# Patient Record
Sex: FEMALE | Race: BLACK OR AFRICAN AMERICAN | NOT HISPANIC OR LATINO | Employment: FULL TIME | URBAN - METROPOLITAN AREA
[De-identification: names, ages, dates, MRNs, and addresses within clinical notes are randomized per-mention and may not be internally consistent; named-entity substitution may affect disease eponyms.]

---

## 2018-09-26 ENCOUNTER — OCCUPATIONAL MEDICINE (OUTPATIENT)
Dept: URGENT CARE | Facility: CLINIC | Age: 33
End: 2018-09-26
Payer: COMMERCIAL

## 2018-09-26 VITALS
WEIGHT: 220 LBS | DIASTOLIC BLOOD PRESSURE: 80 MMHG | RESPIRATION RATE: 16 BRPM | SYSTOLIC BLOOD PRESSURE: 114 MMHG | BODY MASS INDEX: 34.53 KG/M2 | OXYGEN SATURATION: 100 % | HEIGHT: 67 IN | HEART RATE: 87 BPM | TEMPERATURE: 97.8 F
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
## 2018-09-26 DIAGNOSIS — S61.259A CAT BITE OF FINGER, INITIAL ENCOUNTER: Primary | ICD-10-CM

## 2018-09-26 PROCEDURE — 99204 OFFICE O/P NEW MOD 45 MIN: CPT | Mod: 29 | Performed by: INTERNAL MEDICINE

## 2018-09-26 RX ORDER — AMOXICILLIN AND CLAVULANATE POTASSIUM 875; 125 MG/1; MG/1
1 TABLET, FILM COATED ORAL 2 TIMES DAILY
Qty: 14 TAB | Refills: 0 | Status: SHIPPED | OUTPATIENT
Start: 2018-09-26 | End: 2018-10-03

## 2018-09-26 ASSESSMENT — ENCOUNTER SYMPTOMS
CHILLS: 0
VOMITING: 0
COUGH: 0
FEVER: 0
WEAKNESS: 0
CHANGE IN BOWEL HABIT: 0
NAUSEA: 0
SORE THROAT: 0
NUMBNESS: 0

## 2018-09-26 NOTE — LETTER
"   [facility]  [address] - [place] [address]  Phone:  [phone] - Fax:  [phone]   [facility] Progress Report and Disability Certification  Date of Service: 9/26/2018   No Show:  No  Date / Time of Next Visit: 9/29/2018   Claim Information   Patient Name: Allison Collins  Claim Number:     Employer:    Date of Injury: 9/25/2018     Insurer / TPA: [employer]  ID / SSN:     Occupation: VETRINARIAN  Diagnosis: The encounter diagnosis was Cat bite of finger, initial encounter.    Medical Information   Related to Industrial Injury? Yes    Subjective Complaints:  DOI 9/25/18: Patient was bit by a cat at work yesterday at approximately 4 PM.  She has had increased swelling in the area.  She is tried ibuprofen x1 with some improvement.  She says redness and swelling has improved since yesterday.  She denies fever, chills, nausea.  No vomiting or diarrhea.  No numbness or tingling in extremity.  She has full range of motion of finger.  No previous injury or surgery.   Objective Findings:   /80 (BP Location: Right arm, Patient Position: Sitting, BP Cuff Size: Adult)   Pulse 87   Temp 36.6 °C (97.8 °F) (Temporal)   Resp 16   Ht 1.702 m (5' 7\")   Wt 99.8 kg (220 lb)   LMP 09/21/2018 (Exact Date)   SpO2 100%   Breastfeeding? No   BMI 34.46 kg/m²     Physical Exam   Constitutional: She is oriented to person, place, and time. She appears well-developed and well-nourished. No distress.   HENT:   Head: Normocephalic and atraumatic.   Eyes: Pupils are equal, round, and reactive to light. Conjunctivae are normal.   Cardiovascular: Normal rate and regular rhythm.    Pulmonary/Chest: Effort normal and breath sounds normal.   Neurological: She is alert and oriented to person, place, and time.   Skin: Skin is warm and dry. Capillary refill takes less than 2 seconds.        Psychiatric: She has a normal mood and affect. Her behavior is " normal.   Vitals reviewed.    Right first finger: Multiple pinpoint puncture wounds with surrounding erythema and edema.  N/V intact.  Range of motion, strength, tone intact.   Pre-Existing Condition(s):     Assessment:   Initial Visit    Status: Additional Care Required  Permanent Disability:No    Plan: Medication  Comments:Augmentin times 7 days.    Diagnostics:      Comments:       Disability Information   Status: Released to Full Duty    From:  9/26/2018  Through: 9/29/2018 Restrictions are:     Physical Restrictions   Sitting:    Standing:    Stooping:    Bending:      Squatting:    Walking:    Climbing:    Pushing:      Pulling:    Other:    Reaching Above Shoulder (L):   Reaching Above Shoulder (R):       Reaching Below Shoulder (L):    Reaching Below Shoulder (R):      Not to exceed Weight Limits   Carrying(hrs):   Weight Limit(lb):   Lifting(hrs):   Weight  Limit(lb):     Comments:      Repetitive Actions   Hands: i.e. Fine Manipulations from Grasping:     Feet: i.e. Operating Foot Controls:     Driving / Operate Machinery:     Physician Name: Talisha Smith P.A.-C. Physician Signature: TALISHA Hargrove P.A.-C. e-Signature: Dr. Stephen Baron, Medical Director   Clinic Name / Location: [facility] [address]  [place] [address] Clinic Phone Number: Dept: [phone]   Appointment Time: 10:45 Am Visit Start Time: 11:00 AM   Check-In Time:  10:47 Am Visit Discharge Time:  11:37am   Original-Treating Physician or Chiropractor    Page 2-Insurer/TPA    Page 3-Employer    Page 4-Employee

## 2018-09-26 NOTE — PROGRESS NOTES
"Subjective:   Allison Collins is a 33 y.o. female who presents for Cat Bite (Yesterday afternoon, Right hand, first finger.)    DOI 9/25/18: Patient was bit by a cat at work yesterday at approximately 4 PM.  She has had increased swelling in the area.  She is tried ibuprofen x1 with some improvement.  She says redness and swelling has improved since yesterday.  She denies fever, chills, nausea.  No vomiting or diarrhea.  No numbness or tingling in extremity.  She has full range of motion of finger.  No previous injury or surgery.   Animal Bite   This is a new problem. The current episode started yesterday. The problem occurs constantly. The problem has been gradually improving. Pertinent negatives include no change in bowel habit, chills, coughing, fever, nausea, numbness, sore throat, vomiting or weakness. Nothing aggravates the symptoms. She has tried NSAIDs for the symptoms. The treatment provided no relief.     UTD tetanus   Review of Systems   Constitutional: Negative for chills and fever.   HENT: Negative for sore throat.    Respiratory: Negative for cough.    Gastrointestinal: Negative for change in bowel habit, nausea and vomiting.   Neurological: Negative for weakness and numbness.       PMH:  has no past medical history on file.  MEDS:   Current Outpatient Prescriptions:   •  amoxicillin-clavulanate (AUGMENTIN) 875-125 MG Tab, Take 1 Tab by mouth 2 times a day for 7 days., Disp: 14 Tab, Rfl: 0  ALLERGIES: No Known Allergies  SURGHX: History reviewed. No pertinent surgical history.  SOCHX:  reports that she has never smoked. She has never used smokeless tobacco.  History reviewed. No pertinent family history.     Objective:   /80 (BP Location: Right arm, Patient Position: Sitting, BP Cuff Size: Adult)   Pulse 87   Temp 36.6 °C (97.8 °F) (Temporal)   Resp 16   Ht 1.702 m (5' 7\")   Wt 99.8 kg (220 lb)   LMP 09/21/2018 (Exact Date)   SpO2 100%   Breastfeeding? No   BMI 34.46 kg/m²     Physical " "Exam   Constitutional: She is oriented to person, place, and time. She appears well-developed and well-nourished. No distress.   HENT:   Head: Normocephalic and atraumatic.   Eyes: Pupils are equal, round, and reactive to light. Conjunctivae are normal.   Cardiovascular: Normal rate and regular rhythm.    Pulmonary/Chest: Effort normal and breath sounds normal.   Neurological: She is alert and oriented to person, place, and time.   Skin: Skin is warm and dry. Capillary refill takes less than 2 seconds.        Psychiatric: She has a normal mood and affect. Her behavior is normal.   Vitals reviewed.      /80 (BP Location: Right arm, Patient Position: Sitting, BP Cuff Size: Adult)   Pulse 87   Temp 36.6 °C (97.8 °F) (Temporal)   Resp 16   Ht 1.702 m (5' 7\")   Wt 99.8 kg (220 lb)   LMP 09/21/2018 (Exact Date)   SpO2 100%   Breastfeeding? No   BMI 34.46 kg/m²     Physical Exam   Constitutional: She is oriented to person, place, and time. She appears well-developed and well-nourished. No distress.   HENT:   Head: Normocephalic and atraumatic.   Eyes: Pupils are equal, round, and reactive to light. Conjunctivae are normal.   Cardiovascular: Normal rate and regular rhythm.    Pulmonary/Chest: Effort normal and breath sounds normal.   Neurological: She is alert and oriented to person, place, and time.   Skin: Skin is warm and dry. Capillary refill takes less than 2 seconds.        Psychiatric: She has a normal mood and affect. Her behavior is normal.   Vitals reviewed.    Right first finger: Multiple pinpoint puncture wounds with surrounding erythema and edema.  N/V intact.  Range of motion, strength, tone intact.   Assessment/Plan:     1. Cat bite of finger, initial encounter  amoxicillin-clavulanate (AUGMENTIN) 875-125 MG Tab     Patient directed to take full course of abx regardless of sx resolution. If sx worsen or persist patient directed to return to clinic for reevaluation. Supportive care reviewed " including: Wound care, OTC ibuprofen.  Cat bite educational handout given to patient.    Follow-up in urgent care in 3 days.  Follow-up with primary care provider within 7-10 days, red flags and emergency room precautions discussed.  Patient appears understanding of information.     Case and results reviewed and agree with treatment plan as outlined.  Dr. Baron

## 2018-09-26 NOTE — LETTER
"EMPLOYEE’S CLAIM FOR COMPENSATION/ REPORT OF INITIAL TREATMENT  FORM C-4    EMPLOYEE’S CLAIM - PROVIDE ALL INFORMATION REQUESTED   First Name  Allison Last Name  Dennis Birthdate                    1985                Sex  female Claim Number   Home Address  [address] Age  33 y.o. 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 (if applicable)  HOLDING A CAT    How did this injury or occupational disease occur? (Be specific an answer in detail. Use additional sheet if necessary)  CAT BITE FROM FRACTIOUS CAT   If you believe that you have an occupational disease, when did you first have knowledge of the disability and it relationship to your employment?  N/A Witnesses to the Accident  N/A      Nature of Injury or Occupational Disease  Puncture  Part(s) of Body Injured or Affected  Hand (R), N/A, N/A    I certify that the above is true and correct to the best of my knowledge and that I have provided this information in order to obtain the benefits of [place] inclusive or Chapter 617 of [place]).  I hereby authorize any physician, chiropractor, surgeon, practitioner, or other person, any hospital, including [facility] or [facility] hospital, any medical service organization, any insurance company, or other institution or organization to release to each other, any medical or other information, including benefits paid or payable, pertinent to this injury or disease, except information relative to diagnosis, treatment and/or counseling for AIDS, psychological conditions, alcohol or controlled substances, for which I must give specific authorization.  A Photostat of this authorization shall be as valid as the original.     Date   Place   Employee’s Signature   THIS REPORT MUST BE COMPLETED AND MAILED WITHIN 3 WORKING DAYS OF TREATMENT   Place  [facility] [place]  Name of Facility  [facility]   Date  9/26/2018 Diagnosis  (S61.259A,  W55.01XA) Cat bite of finger, initial encounter  (primary encounter diagnosis) Is there evidence the injured employee was under the influence of alcohol and/or another controlled substance at the time of accident?   Hour  11:00 AM Description of Injury or Disease  The encounter diagnosis was Cat bite of finger, initial encounter. No   Treatment  Patient directed to take full course of antibiotics.  Monitor area for changes or signs of infection.  Have you advised the patient to remain off work five days or more? No   X-Ray Findings      If Yes   From Date  To Date      From information given by the employee, together with medical evidence, can you directly connect this injury or occupational disease as job incurred?  Yes If No Full Duty  Yes Modified Duty      Is additional medical care by a physician indicated?  No If Modified Duty, Specify any Limitations / Restrictions      Do you know of any previous injury or disease contributing to this condition or occupational disease?                            No  "  Date  9/26/2018 Print Doctor’s Name Talisha Smith P.A.-C. I certify the employer’s copy of  this form was mailed on:   Address  [address] Insurer’s Use Only     [facility] Zip  [address]    Provider’s Tax ID Number  [id number] Telephone  Dept: [phone]        e-TALISHA Reno P.A.-C.   e-Signature: Dr. Stephen Baron, Medical Director Degree  JORGE        ORIGINAL-TREATING PHYSICIAN OR CHIROPRACTOR    PAGE 2-INSURER/TPA    PAGE 3-EMPLOYER    PAGE 4-EMPLOYEE             Form C-4 (rev10/07)              BRIEF DESCRIPTION OF RIGHTS AND BENEFITS  (Pursuant to NRS 616C.050)    Notice of Injury or Occupational Disease (Incident Report Form C-1): If an injury or occupational disease (OD) arises out of and in the  course of employment, you must provide written notice to your employer as soon as practicable, but no later than 7 days after the accident or  OD. Your employer shall maintain a sufficient supply of the required forms.    Claim for Compensation (Form C-4): If medical treatment is sought, the form C-4 is available at the place of initial treatment. A completed  \"Claim for Compensation\" (Form C-4) must be filed within 90 days after an accident or OD. The treating physician or chiropractor must,  within 3 working days after treatment, complete and mail to the employer, the employer's insurer and third-party , the Claim for  Compensation.    Medical Treatment: If you require medical treatment for your on-the-job injury or OD, you may be required to select a physician or  chiropractor from a list provided by your workers’ compensation insurer, if it has contracted with an Organization for Managed Care (MCO) or  Preferred Provider Organization (PPO) or providers of health care. If your employer has not entered into a contract with an MCO or PPO, you  may select a physician or chiropractor from the Panel of Physicians and Chiropractors. Any medical costs " related to your industrial injury or  OD will be paid by your insurer.    Temporary Total Disability (TTD): If your doctor has certified that you are unable to work for a period of at least 5 consecutive days, or 5  cumulative days in a 20-day period, or places restrictions on you that your employer does not accommodate, you may be entitled to TTD  compensation.    Temporary Partial Disability (TPD): If the wage you receive upon reemployment is less than the compensation for TTD to which you are  entitled, the insurer may be required to pay you TPD compensation to make up the difference. TPD can only be paid for a maximum of 24  months.    Permanent Partial Disability (PPD): When your medical condition is stable and there is an indication of a PPD as a result of your injury or  OD, within 30 days, your insurer must arrange for an evaluation by a rating physician or chiropractor to determine the degree of your PPD. The  amount of your PPD award depends on the date of injury, the results of the PPD evaluation and your age and wage.    Permanent Total Disability (PTD): If you are medically certified by a treating physician or chiropractor as permanently and totally disabled  and have been granted a PTD status by your insurer, you are entitled to receive monthly benefits not to exceed 66 2/3% of your average  monthly wage. The amount of your PTD payments is subject to reduction if you previously received a PPD award.    Vocational Rehabilitation Services: You may be eligible for vocational rehabilitation services if you are unable to return to the job due to a  permanent physical impairment or permanent restrictions as a result of your injury or occupational disease.    Transportation and Per Baljit Reimbursement: You may be eligible for travel expenses and per baljit associated with medical treatment.    Reopening: You may be able to reopen your claim if your condition worsens after claim closure.    Appeal Process: If you  disagree with a written determination issued by the insurer or the insurer does not respond to your request, you may  appeal to the Department of Administration, , by following the instructions contained in your determination letter. You must  appeal the determination within 70 days from the date of the determination letter at [address], [address], [place], [place]  [address], or [address], [address], [place], [place] [address]. If you disagree with the  decision, you may appeal to the  Department of Administration, . You must file your appeal within 30 days from the date of the  decision  letter at [address], [address], [place], [place] [address], or [address], [address], [place], [place] [address]. If you  disagree with a decision of an , you may file a petition for judicial review with the District Court. You must do so within 30  days of the Appeal Officer’s decision. You may be represented by an  at your own expense or you may contact the [facility] for possible  representation.    [place]  for Injured Workers (NAIW): If you disagree with a  decision, you may request that NAIW represent you  without charge at an  Hearing. For information regarding denial of benefits, you may contact the [facility] at: [address], [address], [place] [address], [phone], or [address], [place] [address], [phone]    To File a Complaint with the Division: If you wish to file a complaint with the  of the Division of Industrial Relations (DIR),  please contact the Workers’ Compensation Section, [address], [address], [place], [place] [address], telephone [phone], or  [address], [address], [place], [place] [address], telephone [phone].    For assistance with Workers’ Compensation  Issues: you may contact the Office of the Governor Consumer Health Assistance, [address] [place] [address], [place] [address], [place] [address], Toll Free 1-796.959.3946, Web site: http://govcha.[facility].nv., E-mail  [email or web address].                                                                                                                                                                                                                                   __________________________________________________________________                                                                   _________________                Employee Name / Signature                                                                                                                                                       Date                                                                                                                                                                                                     D-2 (rev. 10/07)

## 2018-09-29 ENCOUNTER — OCCUPATIONAL MEDICINE (OUTPATIENT)
Dept: URGENT CARE | Facility: CLINIC | Age: 33
End: 2018-09-29
Payer: COMMERCIAL

## 2018-09-29 VITALS
RESPIRATION RATE: 16 BRPM | BODY MASS INDEX: 34.53 KG/M2 | SYSTOLIC BLOOD PRESSURE: 104 MMHG | WEIGHT: 220 LBS | DIASTOLIC BLOOD PRESSURE: 50 MMHG | OXYGEN SATURATION: 97 % | HEART RATE: 83 BPM | TEMPERATURE: 99.1 F | HEIGHT: 67 IN
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
## 2018-09-29 DIAGNOSIS — S61.259D CAT BITE OF FINGER, SUBSEQUENT ENCOUNTER: ICD-10-CM

## 2018-09-29 PROCEDURE — 99213 OFFICE O/P EST LOW 20 MIN: CPT | Mod: 29 | Performed by: NURSE PRACTITIONER

## 2018-09-29 ASSESSMENT — ENCOUNTER SYMPTOMS: FEVER: 0

## 2018-09-29 ASSESSMENT — PAIN SCALES - GENERAL: PAINLEVEL: NO PAIN

## 2018-09-29 NOTE — LETTER
[facility] [address] [address] [place] [address]  Phone:  [phone] - Fax:  [phone]   [facility] Progress Report and Disability Certification  Date of Service: 9/29/2018   No Show:  No  Date / Time of Next Visit: 10/4/2018   Claim Information   Patient Name: Allison Collins  Claim Number:     Employer:   [employer] Date of Injury: 9/25/2018     Insurer / TPA: [employer]     Occupation: VETRINARIAN  Diagnosis: The encounter diagnosis was Cat bite of finger, subsequent encounter.    Medical Information   Related to Industrial Injury? Yes    Subjective Complaints:  DOI 9/25/18: Patient was bit by a cat at work.  Initially had increased swelling in the area.  She is tried ibuprofen x1 with some improvement.  Tolerating Augmentin, day 3/7. Redness and swelling has improved.  She denies fever, chills, nausea. No numbness or tingling in extremity.  She has full range of motion of finger. Denies a second job.   Objective Findings: Right first finger: Multiple pinpoint puncture wounds with very mild surrounding erythema.  N/V intact.  Range of motion, strength, tone intact.   Pre-Existing Condition(s):     Assessment:   Condition Improved    Status: Additional Care Required  Permanent Disability:No    Plan:      Diagnostics:      Comments:  Finish course of ABX  RTC in 5 days for FV, anticipate discharge MMI at that time    Disability Information   Status: Released to Full Duty    From:  9/29/2018  Through: 10/4/2018 Restrictions are:     Physical Restrictions   Sitting:    Standing:    Stooping:    Bending:      Squatting:    Walking:    Climbing:    Pushing:      Pulling:    Other:    Reaching Above Shoulder (L):   Reaching Above Shoulder (R):       Reaching Below Shoulder (L):    Reaching Below Shoulder (R):      Not to exceed Weight Limits   Carrying(hrs):   Weight Limit(lb):   Lifting(hrs):   Weight  Limit(lb):     Comments:         Repetitive Actions   Hands: i.e. Fine Manipulations from Grasping:     Feet: i.e. Operating Foot Controls:     Driving / Operate Machinery:     Physician Name: VICENTA Spaulding Physician Signature: AAYUSH Trinh e-Signature: Dr. Stephen Baron, Medical Director   Clinic Name / Location: [address]  [place] [address] Clinic Phone Number: Dept: [phone]   Appointment Time: 8:30 Am Visit Start Time: 9:09 AM   Check-In Time:  9:04 Am Visit Discharge Time: 9:57 AM    Original-Treating Physician or Chiropractor    Page 2-Insurer/TPA    Page 3-Employer    Page 4-Employee

## 2018-09-29 NOTE — PROGRESS NOTES
"Subjective:      Allison Collins is a 33 y.o. female who presents with Follow-Up (WC FV RT finger cat bite.  PT states since previous visit, injury is feeling much better.)      DOI 9/25/18: Patient was bit by a cat at work.  Initially had increased swelling in the area.  She is tried ibuprofen x1 with some improvement.  Tolerating Augmentin, day 3/7. Redness and swelling has improved.  She denies fever, chills, nausea. No numbness or tingling in extremity.  She has full range of motion of finger. Denies a second job.     HPI    Review of Systems   Constitutional: Negative for fever.   Musculoskeletal:        Cat bite to right finger   All other systems reviewed and are negative.    History reviewed. No pertinent past medical history. History reviewed. No pertinent surgical history.   Social History     Social History   • Marital status: Other     Spouse name: N/A   • Number of children: N/A   • Years of education: N/A     Occupational History   • Not on file.     Social History Main Topics   • Smoking status: Never Smoker   • Smokeless tobacco: Never Used   • Alcohol use Not on file   • Drug use: Unknown   • Sexual activity: Not on file     Other Topics Concern   • Not on file     Social History Narrative   • No narrative on file          Objective:     /50 (BP Location: Right arm, Patient Position: Sitting, BP Cuff Size: Large adult)   Pulse 83   Temp 37.3 °C (99.1 °F) (Temporal)   Resp 16   Ht 1.702 m (5' 7\")   Wt 99.8 kg (220 lb)   LMP 09/21/2018 (Exact Date)   SpO2 97%   BMI 34.46 kg/m²      Physical Exam   Constitutional: She is oriented to person, place, and time. Vital signs are normal. She appears well-developed and well-nourished.   HENT:   Head: Normocephalic and atraumatic.   Eyes: Pupils are equal, round, and reactive to light. EOM are normal.   Neck: Normal range of motion.   Cardiovascular: Normal rate and regular rhythm.    Pulmonary/Chest: Effort normal.   Musculoskeletal: Normal range " of motion.   See D-39 exam notes   Neurological: She is alert and oriented to person, place, and time.   Skin: Skin is warm and dry. Capillary refill takes less than 2 seconds.   Psychiatric: She has a normal mood and affect. Her speech is normal and behavior is normal. Thought content normal.   Vitals reviewed.      Right first finger: Multiple pinpoint puncture wounds with very mild surrounding erythema.  N/V intact.  Range of motion, strength, tone intact.       Assessment/Plan:     1. Cat bite of finger, subsequent encounter    Finish course of ABX  RTC in 5 days for FV, anticipate discharge MMI at that time

## 2018-10-04 ENCOUNTER — OCCUPATIONAL MEDICINE (OUTPATIENT)
Dept: URGENT CARE | Facility: CLINIC | Age: 33
End: 2018-10-04
Payer: COMMERCIAL

## 2018-10-04 VITALS
TEMPERATURE: 98.3 F | HEART RATE: 63 BPM | BODY MASS INDEX: 35 KG/M2 | HEIGHT: 67 IN | WEIGHT: 223 LBS | DIASTOLIC BLOOD PRESSURE: 72 MMHG | SYSTOLIC BLOOD PRESSURE: 118 MMHG | OXYGEN SATURATION: 96 %
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
## 2018-10-04 PROCEDURE — 99212 OFFICE O/P EST SF 10 MIN: CPT | Mod: 29 | Performed by: PHYSICIAN ASSISTANT

## 2018-10-04 ASSESSMENT — ENCOUNTER SYMPTOMS
CONSTIPATION: 0
NAUSEA: 0
ABDOMINAL PAIN: 0
TINGLING: 0
FEVER: 0
WEAKNESS: 0
VOMITING: 0
DIARRHEA: 0
COUGH: 0
CHILLS: 0

## 2018-10-04 NOTE — LETTER
"   [facility]  [address] - [place] [address]  Phone:  [phone] - Fax:  [phone]   [facility] Progress Report and Disability Certification  Date of Service: 10/4/2018   No Show:  No  Date / Time of Next Visit:     Claim Information   Patient Name: Allison Collins  Claim Number:     Employer:    Date of Injury: 9/25/2018     Insurer / TPA: [employer]  ID / SSN:     Occupation: VETRINARIAN  Diagnosis: The encounter diagnosis was Cat bite, subsequent encounter.    Medical Information   Related to Industrial Injury? Yes    Subjective Complaints:  DOI 9/25/18: Patient was bit by a cat at work yesterday at approximately 4 PM.  She has had increased swelling in the area.  She is tried ibuprofen x1 with some improvement.  She says redness and swelling has improved since yesterday.  She denies fever, chills, nausea.  No vomiting or diarrhea.  No numbness or tingling in extremity.  She has full range of motion of finger.  No previous injury or surgery.     10/4/18 f/u: Bite much improved.  Patient completed course of Augmentin without complications.  She denies pain in area.  No numbness or tingling.  No difficulty with  or lifting with right hand.  No fever, chills.  No nausea, vomiting or diarrhea.  Patient has been back to work on full duty.   Objective Findings: /72 (BP Location: Right arm, Patient Position: Sitting, BP Cuff Size: Adult)   Pulse 63   Temp 36.8 °C (98.3 °F) (Temporal)   Ht 1.702 m (5' 7\")   Wt 101.2 kg (223 lb)   LMP 09/21/2018 (Exact Date)   SpO2 96%   BMI 34.93 kg/m²     Physical Exam   Constitutional: She is oriented to person, place, and time. She appears well-developed and well-nourished. No distress.   HENT:   Head: Normocephalic and atraumatic.   Eyes: Pupils are equal, round, and reactive to light. Conjunctivae are normal.   Cardiovascular: Normal rate and regular rhythm.    Pulmonary/Chest: Effort " normal and breath sounds normal.   Neurological: She is alert and oriented to person, place, and time.   Skin: Skin is warm and dry. Capillary refill takes less than 2 seconds.        Psychiatric: She has a normal mood and affect. Her behavior is normal.   Vitals reviewed.    L 2nd finger: 2 well-healed puncture wounds.  No erythema or swelling.  N/V intact.  ROM, strength, tone intact bilateral upper extremities.   Pre-Existing Condition(s):     Assessment:   Condition Improved    Status: Discharged /  MMI  Permanent Disability:No    Plan: Medication  Comments:Completed course of Augmentin    Diagnostics:      Comments:       Disability Information   Status: Released to Full Duty    From:  10/4/2018  Through:   Restrictions are:     Physical Restrictions   Sitting:    Standing:    Stooping:    Bending:      Squatting:    Walking:    Climbing:    Pushing:      Pulling:    Other:    Reaching Above Shoulder (L):   Reaching Above Shoulder (R):       Reaching Below Shoulder (L):    Reaching Below Shoulder (R):      Not to exceed Weight Limits   Carrying(hrs):   Weight Limit(lb):   Lifting(hrs):   Weight  Limit(lb):     Comments:      Repetitive Actions   Hands: i.e. Fine Manipulations from Grasping:     Feet: i.e. Operating Foot Controls:     Driving / Operate Machinery:     Physician Name: Talisha Smith P.A.-C. Physician Signature: TALISHA Hargrove P.A.-C. e-Signature: Dr. Stephen Baron, Medical Director   Clinic Name / Location: [address] [place] [address] [place] [address] Clinic Phone Number: Dept: [phone]   Appointment Time: 5:30 Pm Visit Start Time: 6:02 PM   Check-In Time:  5:25 Pm Visit Discharge Time:  6:40 PM   Original-Treating Physician or Chiropractor    Page 2-Insurer/TPA    Page 3-Employer    Page 4-Employee

## 2018-10-05 NOTE — PROGRESS NOTES
"Subjective:   Allison Collins is a 33 y.o. female who presents for Animal Bite (cat bite follow up, Pt states it is doing well)      DOI 9/25/18: Patient was bit by a cat at work yesterday at approximately 4 PM.  She has had increased swelling in the area.  She is tried ibuprofen x1 with some improvement.  She says redness and swelling has improved since yesterday.  She denies fever, chills, nausea.  No vomiting or diarrhea.  No numbness or tingling in extremity.  She has full range of motion of finger.  No previous injury or surgery.     10/4/18 f/u: Bite much improved.  Patient completed course of Augmentin without complications.  She denies pain in area.  No numbness or tingling.  No difficulty with  or lifting with right hand.  No fever, chills.  No nausea, vomiting or diarrhea.  Patient has been back to work on full duty.       Review of Systems   Constitutional: Negative for chills, fever and malaise/fatigue.   Respiratory: Negative for cough.    Gastrointestinal: Negative for abdominal pain, constipation, diarrhea, nausea and vomiting.   Neurological: Negative for tingling and weakness.   All other systems reviewed and are negative.      PMH:  has no past medical history on file.  MEDS: No current outpatient prescriptions on file.  ALLERGIES: No Known Allergies  SURGHX: No past surgical history on file.  SOCHX:  reports that she has never smoked. She has never used smokeless tobacco.  No family history on file.     Objective:   /72 (BP Location: Right arm, Patient Position: Sitting, BP Cuff Size: Adult)   Pulse 63   Temp 36.8 °C (98.3 °F) (Temporal)   Ht 1.702 m (5' 7\")   Wt 101.2 kg (223 lb)   LMP 09/21/2018 (Exact Date)   SpO2 96%   BMI 34.93 kg/m²     Physical Exam   Constitutional: She is oriented to person, place, and time. She appears well-developed and well-nourished. No distress.   HENT:   Head: Normocephalic and atraumatic.   Eyes: Pupils are equal, round, and reactive to light. " Conjunctivae are normal.   Cardiovascular: Normal rate and regular rhythm.    Pulmonary/Chest: Effort normal and breath sounds normal.   Neurological: She is alert and oriented to person, place, and time.   Skin: Skin is warm and dry. Capillary refill takes less than 2 seconds.        Psychiatric: She has a normal mood and affect. Her behavior is normal.   Vitals reviewed.      Assessment/Plan:     1. Cat bite, subsequent encounter       Watch for signs of infection.  Patient released to [facility].  D 39 provided. Follow-up with primary care provider within 7-10 days, emergency room precautions discussed.  Patient appears understanding of information.     Case and results reviewed and agree with treatment plan as outlined.  Dr. Baron

## 2019-04-01 ENCOUNTER — OFFICE VISIT (OUTPATIENT)
Dept: URGENT CARE | Facility: CLINIC | Age: 34
End: 2019-04-01

## 2019-04-01 ENCOUNTER — HOSPITAL ENCOUNTER (OUTPATIENT)
Facility: MEDICAL CENTER | Age: 34
End: 2019-04-01
Attending: PHYSICIAN ASSISTANT
Payer: COMMERCIAL

## 2019-04-01 VITALS
BODY MASS INDEX: 34.69 KG/M2 | OXYGEN SATURATION: 99 % | WEIGHT: 221 LBS | TEMPERATURE: 98 F | RESPIRATION RATE: 16 BRPM | DIASTOLIC BLOOD PRESSURE: 60 MMHG | HEART RATE: 66 BPM | HEIGHT: 67 IN | SYSTOLIC BLOOD PRESSURE: 114 MMHG

## 2019-04-01 DIAGNOSIS — N30.90 CYSTITIS: ICD-10-CM

## 2019-04-01 PROCEDURE — 99214 OFFICE O/P EST MOD 30 MIN: CPT | Performed by: PHYSICIAN ASSISTANT

## 2019-04-01 PROCEDURE — 87086 URINE CULTURE/COLONY COUNT: CPT

## 2019-04-01 RX ORDER — NITROFURANTOIN 25; 75 MG/1; MG/1
100 CAPSULE ORAL EVERY 12 HOURS
Qty: 10 CAP | Refills: 0 | Status: SHIPPED | OUTPATIENT
Start: 2019-04-01 | End: 2019-04-06

## 2019-04-01 ASSESSMENT — ENCOUNTER SYMPTOMS
ABDOMINAL PAIN: 0
PAIN: 1
BACK PAIN: 1
FLANK PAIN: 0
CHILLS: 0
FEVER: 0

## 2019-04-02 DIAGNOSIS — N30.90 CYSTITIS: ICD-10-CM

## 2019-04-02 NOTE — PROGRESS NOTES
"  Subjective:   Allison Collins is a 34 y.o. female who presents today with   Chief Complaint   Patient presents with   • Pain     left lower back pain for few days       Pain   This is a new problem. The current episode started in the past 7 days. The problem occurs intermittently. Pertinent negatives include no abdominal pain, chills, fever or urinary symptoms. Exacerbated by: deep palpation.       PMH:  has no past medical history on file.  MEDS:   Current Outpatient Prescriptions:   •  nitrofurantoin monohyd macro (MACROBID) 100 MG Cap, Take 1 Cap by mouth every 12 hours for 5 days., Disp: 10 Cap, Rfl: 0  ALLERGIES: No Known Allergies  SURGHX: History reviewed. No pertinent surgical history.  SOCHX:  reports that she has never smoked. She has never used smokeless tobacco.  FH: Reviewed with patient, not pertinent to this visit.       Review of Systems   Constitutional: Negative for chills and fever.   Gastrointestinal: Negative for abdominal pain.   Genitourinary: Negative for dysuria, flank pain, frequency, hematuria and urgency.   Musculoskeletal: Positive for back pain.   All other systems reviewed and are negative.       Objective:   /60 (BP Location: Left arm, Patient Position: Sitting, BP Cuff Size: Adult)   Pulse 66   Temp 36.7 °C (98 °F) (Temporal)   Resp 16   Ht 1.702 m (5' 7\")   Wt 100.2 kg (221 lb)   SpO2 99%   BMI 34.61 kg/m²   Physical Exam   Constitutional: She appears well-developed and well-nourished. No distress.   HENT:   Head: Normocephalic and atraumatic.   Eyes: Pupils are equal, round, and reactive to light.   Cardiovascular: Normal rate, regular rhythm and normal heart sounds.    Pulmonary/Chest: Effort normal and breath sounds normal.   Musculoskeletal:   Normal movement in all 4 extremities   Neurological: She is alert. Coordination normal.   Skin: Skin is warm and dry.   Psychiatric: She has a normal mood and affect.   Nursing note and vitals reviewed.  Patient's pain is " reproduced with deep palpation near kidney in right lower back. She does not have pain reproduced with lifting or twisting.   UA Trace Lysed Blood, Sole Small  No CVA tenderness  Assessment/Plan:   Assessment    1. Cystitis  - Urine Culture; Future  - nitrofurantoin monohyd macro (MACROBID) 100 MG Cap; Take 1 Cap by mouth every 12 hours for 5 days.  Dispense: 10 Cap; Refill: 0  Patient does not have any urinary symptoms but given her urine results she was treated with antibiotic today. Likely UTI vs muscle strain.   Differential diagnosis, natural history, supportive care, and indications for immediate follow-up discussed.   Patient given instructions and understanding of medications and treatment.    If not improving in 3-5 days, F/U with PCP or return to UC if symptoms worsen.  Strict ER precautions given if fever or pain persists.   Patient agreeable to plan.      Livan Vicente PA-C

## 2019-04-04 LAB
BACTERIA UR CULT: NORMAL
SIGNIFICANT IND 70042: NORMAL
SITE SITE: NORMAL
SOURCE SOURCE: NORMAL

## 2020-01-12 ENCOUNTER — OFFICE VISIT (OUTPATIENT)
Dept: URGENT CARE | Facility: CLINIC | Age: 35
End: 2020-01-12
Payer: COMMERCIAL

## 2020-01-12 VITALS
RESPIRATION RATE: 20 BRPM | DIASTOLIC BLOOD PRESSURE: 80 MMHG | WEIGHT: 211 LBS | TEMPERATURE: 98.3 F | HEART RATE: 78 BPM | SYSTOLIC BLOOD PRESSURE: 130 MMHG | OXYGEN SATURATION: 99 % | BODY MASS INDEX: 33.05 KG/M2

## 2020-01-12 DIAGNOSIS — J32.9 RHINOSINUSITIS: ICD-10-CM

## 2020-01-12 PROCEDURE — 99214 OFFICE O/P EST MOD 30 MIN: CPT | Performed by: PHYSICIAN ASSISTANT

## 2020-01-12 RX ORDER — FLUTICASONE PROPIONATE 50 MCG
1 SPRAY, SUSPENSION (ML) NASAL DAILY
Qty: 16 G | Refills: 0 | Status: SHIPPED | OUTPATIENT
Start: 2020-01-12 | End: 2021-08-28

## 2020-01-12 RX ORDER — AMOXICILLIN AND CLAVULANATE POTASSIUM 875; 125 MG/1; MG/1
1 TABLET, FILM COATED ORAL 2 TIMES DAILY
Qty: 14 TAB | Refills: 0 | Status: SHIPPED | OUTPATIENT
Start: 2020-01-12 | End: 2020-01-19

## 2020-01-12 ASSESSMENT — ENCOUNTER SYMPTOMS
WHEEZING: 0
COUGH: 1
FEVER: 0
RHINORRHEA: 1
CHILLS: 0
MYALGIAS: 0
SHORTNESS OF BREATH: 0
SORE THROAT: 1
HEMOPTYSIS: 0
HEADACHES: 1

## 2020-01-12 ASSESSMENT — COPD QUESTIONNAIRES: COPD: 0

## 2020-01-12 NOTE — PROGRESS NOTES
Subjective:   Allison Collins is a 34 y.o. female who presents for Sinusitis        Cough   This is a new problem. The current episode started 1 to 4 weeks ago (4 weeks). The problem has been waxing and waning. The problem occurs constantly. The cough is non-productive. Associated symptoms include headaches, nasal congestion, postnasal drip, rhinorrhea and a sore throat. Pertinent negatives include no chest pain, chills, ear congestion, ear pain, fever, hemoptysis, myalgias, shortness of breath or wheezing. Associated symptoms comments: Sinus pressure off and on. Treatments tried: mucinex, robitussin. The treatment provided moderate relief. There is no history of asthma, bronchitis, COPD, environmental allergies or pneumonia.     Review of Systems   Constitutional: Negative for chills and fever.   HENT: Positive for postnasal drip, rhinorrhea and sore throat. Negative for ear pain.    Respiratory: Positive for cough. Negative for hemoptysis, shortness of breath and wheezing.    Cardiovascular: Negative for chest pain.   Musculoskeletal: Negative for myalgias.   Neurological: Positive for headaches.   Endo/Heme/Allergies: Negative for environmental allergies.       PMH:  has no past medical history of Allergy or Asthma.  MEDS:   Current Outpatient Medications:   •  amoxicillin-clavulanate (AUGMENTIN) 875-125 MG Tab, Take 1 Tab by mouth 2 times a day for 7 days., Disp: 14 Tab, Rfl: 0  •  fluticasone (FLONASE) 50 MCG/ACT nasal spray, Spray 1 Spray in nose every day., Disp: 16 g, Rfl: 0  ALLERGIES: No Known Allergies  SURGHX: History reviewed. No pertinent surgical history.  SOCHX:  reports that she has never smoked. She has never used smokeless tobacco.  FH: Family history was reviewed, no pertinent findings to report   Objective:   /80   Pulse 78   Temp 36.8 °C (98.3 °F) (Temporal)   Resp 20   Wt 95.7 kg (211 lb)   SpO2 99%   BMI 33.05 kg/m²   Physical Exam  Vitals signs reviewed.   Constitutional:        General: She is not in acute distress.     Appearance: Normal appearance. She is well-developed. She is not toxic-appearing.   HENT:      Head: Normocephalic and atraumatic.      Right Ear: Tympanic membrane, ear canal and external ear normal.      Left Ear: Tympanic membrane, ear canal and external ear normal.      Nose: Mucosal edema, congestion and rhinorrhea present. Rhinorrhea is purulent.      Right Sinus: No maxillary sinus tenderness or frontal sinus tenderness.      Left Sinus: No maxillary sinus tenderness or frontal sinus tenderness.      Mouth/Throat:      Lips: Pink.      Mouth: Mucous membranes are moist.      Pharynx: Oropharynx is clear. Uvula midline.   Eyes:      General: Lids are normal.      Conjunctiva/sclera: Conjunctivae normal.   Neck:      Musculoskeletal: Neck supple.   Cardiovascular:      Rate and Rhythm: Normal rate and regular rhythm.      Heart sounds: Normal heart sounds, S1 normal and S2 normal. No murmur. No friction rub. No gallop.    Pulmonary:      Effort: Pulmonary effort is normal. No respiratory distress.      Breath sounds: Normal breath sounds. No decreased breath sounds, wheezing, rhonchi or rales.   Musculoskeletal:      Comments: Normal range of motion. Exhibits no edema and no tenderness.    Lymphadenopathy:      Cervical: No cervical adenopathy.      Right cervical: No superficial or posterior cervical adenopathy.     Left cervical: No superficial or posterior cervical adenopathy.   Skin:     General: Skin is warm and dry.      Capillary Refill: Capillary refill takes less than 2 seconds.   Neurological:      Mental Status: She is alert and oriented to person, place, and time.      Cranial Nerves: No cranial nerve deficit.      Sensory: No sensory deficit.   Psychiatric:         Speech: Speech normal.         Behavior: Behavior normal.         Thought Content: Thought content normal.         Judgment: Judgment normal.           Assessment/Plan:   1. Rhinosinusitis  -  amoxicillin-clavulanate (AUGMENTIN) 875-125 MG Tab; Take 1 Tab by mouth 2 times a day for 7 days.  Dispense: 14 Tab; Refill: 0  - fluticasone (FLONASE) 50 MCG/ACT nasal spray; Spray 1 Spray in nose every day.  Dispense: 16 g; Refill: 0    Pt instructed to complete full course of medication despite symptomatic improvement. Pt to take med with meals to alleviate GI upset. Pt to take a probiotic or eat Neha’s yogurt/ kefir while taking the medication.    Flonase 1 squirt in each nostril, as instructed in clinic, once a day.  Use nasal saline TID to promote drainage.   Salt water gurgles to soothe sore throat.  Ayr saline gel to moisturize nasal passage and prevent bleeding.  Try to use sudafed sparingly in order to allow sinuses to drain.  Avoid the longer formulations and try to take only in the morning and/or at noon if needed.  If you fail to improve in 3-5 days or symptoms worsen/new symptoms develop, RTC for reevaluation    Differential diagnosis, natural history, supportive care, and indications for immediate follow-up discussed.

## 2020-01-23 ENCOUNTER — HOSPITAL ENCOUNTER (OUTPATIENT)
Dept: RADIOLOGY | Facility: MEDICAL CENTER | Age: 35
End: 2020-01-23
Attending: NURSE PRACTITIONER
Payer: COMMERCIAL

## 2020-01-23 ENCOUNTER — OFFICE VISIT (OUTPATIENT)
Dept: URGENT CARE | Facility: MEDICAL CENTER | Age: 35
End: 2020-01-23
Payer: COMMERCIAL

## 2020-01-23 VITALS
HEART RATE: 103 BPM | WEIGHT: 214.6 LBS | SYSTOLIC BLOOD PRESSURE: 112 MMHG | RESPIRATION RATE: 12 BRPM | HEIGHT: 67 IN | TEMPERATURE: 99.1 F | BODY MASS INDEX: 33.68 KG/M2 | DIASTOLIC BLOOD PRESSURE: 70 MMHG | OXYGEN SATURATION: 99 %

## 2020-01-23 DIAGNOSIS — R50.9 FEVER, UNSPECIFIED FEVER CAUSE: ICD-10-CM

## 2020-01-23 DIAGNOSIS — J10.1 INFLUENZA A: ICD-10-CM

## 2020-01-23 DIAGNOSIS — R05.9 COUGH: ICD-10-CM

## 2020-01-23 LAB
FLUAV+FLUBV AG SPEC QL IA: NORMAL
INT CON NEG: NORMAL
INT CON POS: NORMAL

## 2020-01-23 PROCEDURE — 87804 INFLUENZA ASSAY W/OPTIC: CPT | Performed by: NURSE PRACTITIONER

## 2020-01-23 PROCEDURE — 71046 X-RAY EXAM CHEST 2 VIEWS: CPT

## 2020-01-23 PROCEDURE — 99214 OFFICE O/P EST MOD 30 MIN: CPT | Performed by: NURSE PRACTITIONER

## 2020-01-23 RX ORDER — ALBUTEROL SULFATE 90 UG/1
2 AEROSOL, METERED RESPIRATORY (INHALATION) EVERY 6 HOURS PRN
Qty: 8.5 G | Refills: 0 | Status: SHIPPED | OUTPATIENT
Start: 2020-01-23 | End: 2021-08-28

## 2020-01-23 RX ORDER — CODEINE PHOSPHATE AND GUAIFENESIN 10; 100 MG/5ML; MG/5ML
5 SOLUTION ORAL EVERY 6 HOURS PRN
Qty: 120 ML | Refills: 0 | Status: SHIPPED | OUTPATIENT
Start: 2020-01-23 | End: 2020-01-30

## 2020-01-23 RX ORDER — OSELTAMIVIR PHOSPHATE 75 MG/1
75 CAPSULE ORAL 2 TIMES DAILY
Qty: 10 CAP | Refills: 0 | Status: SHIPPED | OUTPATIENT
Start: 2020-01-23 | End: 2020-05-13

## 2020-01-23 ASSESSMENT — ENCOUNTER SYMPTOMS
SPUTUM PRODUCTION: 1
CONSTITUTIONAL NEGATIVE: 1
COUGH: 1

## 2020-01-24 NOTE — PROGRESS NOTES
Subjective:      Allison Collins is a 34 y.o. female who presents with Fever ([id number], fever/cough/sneezing/body aches)    History reviewed. No pertinent past medical history.    Social History     Socioeconomic History   • Marital status: Other     Spouse name: Not on file   • Number of children: Not on file   • Years of education: Not on file   • Highest education level: Not on file   Occupational History   • Not on file   Social Needs   • Financial resource strain: Not on file   • Food insecurity:     Worry: Not on file     Inability: Not on file   • Transportation needs:     Medical: Not on file     Non-medical: Not on file   Tobacco Use   • Smoking status: Never Smoker   • Smokeless tobacco: Never Used   Substance and Sexual Activity   • Alcohol use: Not on file   • Drug use: Not on file   • Sexual activity: Not on file   Lifestyle   • Physical activity:     Days per week: Not on file     Minutes per session: Not on file   • Stress: Not on file   Relationships   • Social connections:     Talks on phone: Not on file     Gets together: Not on file     Attends [employer] service: Not on file     Active member of club or organization: Not on file     Attends meetings of clubs or organizations: Not on file     Relationship status: Not on file   • Intimate partner violence:     Fear of current or ex partner: Not on file     Emotionally abused: Not on file     Physically abused: Not on file     Forced sexual activity: Not on file   Other Topics Concern   • Not on file   Social History Narrative   • Not on file     History reviewed. No pertinent family history.    Allergies: Patient has no known allergies.    Patient is a 34-year-old female who presents today with complaint of fever, body aches, chills, and cough.  States she has been sick off and on over the last 6 weeks.  She recently completed a course of antibiotics for sinus infection.  She denies any difficulty breathing though does complain of intermittent  "shortness of breath with activity.          Cough   This is a new problem. The problem has been unchanged. The problem occurs every few minutes. The cough is productive of brown sputum. Nothing aggravates the symptoms. She has tried nothing for the symptoms. The treatment provided no relief.       Review of Systems   Constitutional: Negative.    Respiratory: Positive for cough and sputum production.    All other systems reviewed and are negative.         Objective:     /70 (BP Location: Left arm, Patient Position: Sitting, BP Cuff Size: Adult)   Pulse (!) 103   Temp 37.3 °C (99.1 °F) (Temporal)   Resp 12   Ht 1.702 m (5' 7\")   Wt 97.3 kg (214 lb 9.6 oz)   SpO2 99%   BMI 33.61 kg/m²      Physical Exam  Vitals signs reviewed.   Constitutional:       Appearance: Normal appearance.   HENT:      Head: Normocephalic.      Right Ear: Tympanic membrane, ear canal and external ear normal.      Left Ear: Tympanic membrane, ear canal and external ear normal.      Nose: Congestion present.      Mouth/Throat:      Mouth: Mucous membranes are moist.   Eyes:      Extraocular Movements: Extraocular movements intact.      Conjunctiva/sclera: Conjunctivae normal.      Pupils: Pupils are equal, round, and reactive to light.   Neck:      Musculoskeletal: Normal range of motion and neck supple.   Cardiovascular:      Rate and Rhythm: Normal rate and regular rhythm.      Heart sounds: Normal heart sounds.   Pulmonary:      Effort: Pulmonary effort is normal.      Breath sounds: Normal breath sounds.   Musculoskeletal: Normal range of motion.   Skin:     General: Skin is warm and dry.   Neurological:      Mental Status: She is alert and oriented to person, place, and time.   Psychiatric:         Mood and Affect: Mood normal.         Behavior: Behavior normal.         Thought Content: Thought content normal.         Judgment: Judgment normal.       X-ray, chest: Negative for pneumonia per radiologist    Point-of-care " influenza: Positive a          Assessment/Plan:     Cough  Influenza A    Tamiflu  Tylenol/Motrin as needed  Albuterol inhaler as needed  Cheratussin as needed for cough; clearly stated no driving or alcohol with this medication.  Checked patient's  and find no evidence for narcotic misuse  ER precautions given for respiratory distress  Call or return to urgent care otherwise for any further questions or concerns  There are no diagnoses linked to this encounter.

## 2020-03-13 ENCOUNTER — TELEPHONE (OUTPATIENT)
Dept: SCHEDULING | Facility: IMAGING CENTER | Age: 35
End: 2020-03-13

## 2020-03-13 ENCOUNTER — TELEPHONE (OUTPATIENT)
Dept: INTERNAL MEDICINE | Facility: IMAGING CENTER | Age: 35
End: 2020-03-13

## 2020-03-13 NOTE — TELEPHONE ENCOUNTER
1. Caller Name: Allison                      Call Back Number: [phone]  [facility] PCP or Specialty Provider: No          2.  Does patient have any active symptoms of respiratory illness (fever OR cough OR shortness of breath)? Yes, the patient reports the following respiratory symptoms: Allergies. Runny nose, itchy eyes, nasal congestion    3.  Does patient have any comoribidities? None     4.  In the last 30 days, has the patient traveled outside of the country OR in a high risk area within the  OR have any known contact with someone who has or is suspected to have COVID-19?  Yes, [place], [place] No contact with sick persons.     5. POTENTIAL PUI (LOW): Advised to perform self care, monitor for worsening symptoms and to call back in 3 days if no improvement CLEARED         Note routed to PCP: NO PCP at this time.

## 2020-05-11 NOTE — PROGRESS NOTES
Subjective:     CC:  Diagnoses of Obesity (BMI 30-39.9), Allergic rhinitis, unspecified seasonality, unspecified trigger, Sore armpit, left, and Need for vaccination were pertinent to this visit.    HISTORY OF THE PRESENT ILLNESS: Patient is a 35 y.o. female. This pleasant patient is here today to establish care and discuss allergy symptoms. Her prior PCP was Dr. Frost in [place].  States she was seen in [place] prior to that.  Would like Tdap today.    Allergic rhinitis  This is a chronic condition.  Onset: more than 10 years ago  Location: eyes, nose, sinus  Duration: constant  Modifying factors:  Some improvement with zyrtec and benadryl with less sneezing fits, however still gets itchy eyes occasionally and sneezing fits  Associated symptoms: sneezing, light cough, runny nose, itchy watery eyes, sinus pressure when symptoms are very severe.  Occasionally hives if direct contact with allergens.  States she had allergy testing in the past- trees, weeds, dog saliva, and some raw fruits and veggies.  States some foods such as celery make her throat feel like it is closing and oranges make her throat itchy.  Does not currently have an epi pen.  Denies ever needing intubation in the past.  She previously had immunotherapy about 10 years ago which helped improve symptoms, however symptoms are starting to reoccur more often and severely over the last 1-2 years  Home treatments: Zyrtec, benadryl daily.  She uses flonase about once per week.  Does not have SOB, so not using albuterol and no hx of asthma.      Sore armpit, left  This is a new condition.  Onset: yesterday  Location: left armpit  Duration: constant, mild  Quality: dull  Modifying factors: no improvement with rest or stretching.   Precipitating trauma: none  Associated symptoms: no redness, swelling, discharge.  Home treatments: none.  States she has had similar issues in the past after lifting heavy objects.  States her right armpit occasionally has the  same soreness.  No weakness, swelling, or skin changes.  She has not recently shaved the area        Allergies: Patient has no known allergies.    Current Outpatient Medications Ordered in Epic   Medication Sig Dispense Refill   • cetirizine (ZYRTEC) 10 MG Tab Take 10 mg by mouth every day.     • diphenhydrAMINE (BENADRYL) 25 MG Tab Take 25 mg by mouth every 6 hours as needed for Sleep.     • EPINEPHrine (EPIPEN 2-RYAN) 0.3 MG/0.3ML Solution Auto-injector solution for injection 0.3 mL by Intramuscular route Once for 1 dose. 0.3 mL 0   • albuterol 108 (90 Base) MCG/ACT Aero Soln inhalation aerosol Inhale 2 Puffs by mouth every 6 hours as needed. 8.5 g 0   • fluticasone (FLONASE) 50 MCG/ACT nasal spray Spray 1 Spray in nose every day. 16 g 0     No current Epic-ordered facility-administered medications on file.        Past Medical History:   Diagnosis Date   • Allergic rhinitis 5/13/2020       Past Surgical History:   Procedure Laterality Date   • DENTAL EXTRACTION(S)      wisdom teeth extraction       Social History     Tobacco Use   • Smoking status: Light Tobacco Smoker     Packs/day: 0.00     Types: Cigars   • Smokeless tobacco: Never Used   Substance Use Topics   • Alcohol use: Yes     Frequency: 2-3 times a week     Drinks per session: 1 or 2   • Drug use: Not Currently       Social History     Social History Narrative   • Not on file       Family History   Problem Relation Age of Onset   • Hypertension Mother    • Cancer Maternal Grandmother         breast cancer at 88   • Hypertension Maternal Grandmother    • Diabetes Maternal Grandmother        Health Maintenance: Tdap given today    ROS:   Gen: no fevers/chills, no changes in weight  Eyes: no changes in vision  ENT: no sore throat, no hearing loss, no bloody nose  Pulm: no sob, no cough  CV: no chest pain, no palpitations  GI: no nausea/vomiting, no diarrhea  : no dysuria  MSk: no myalgias  Skin: no rash  Neuro: no headaches, no  "numbness/tingling  Heme/Lymph: no easy bruising      Objective:     Exam: BP (!) 96/60 (BP Location: Left arm, Patient Position: Sitting, BP Cuff Size: Adult)   Pulse 68   Temp 36.9 °C (98.4 °F) (Temporal)   Ht 1.702 m (5' 7\")   Wt 100.4 kg (221 lb 6.4 oz)   SpO2 100%  Body mass index is 34.68 kg/m².    General: Normal appearing. No distress.  HEENT: Normocephalic.  Canals are clear bilaterally, tympanic membranes are benign, nasal mucosa benign, oropharynx is without erythema, edema or exudates.   Eyes: Eyes conjunctiva clear lids without ptosis, pupils equal and reactive to light accommodation, ears normal shape and contour  Neck: Supple. Thyroid is not enlarged.  Pulmonary: Clear to ausculation.  Normal effort. No rales, ronchi, or wheezing.  Cardiovascular: Regular rate and rhythm without murmur.   Abdomen: Soft, nontender, nondistended. Normal bowel sounds. Liver and spleen are not palpable  Neurologic: No gross motor deficits  Lymph: No cervical or supraclavicular lymph nodes are palpable.  No axillary lymph nodes palpated.  Skin: Warm and dry.  No obvious lesions.  Musculoskeletal: Normal gait. No extremity cyanosis, clubbing, or edema.  Psych: Normal mood and affect. Alert and oriented x3. Judgment and insight is normal.  Breast:  Bilaterally symmetrical, no nipple discharge, no skin changes or dimpling are  noted.  Both breasts are free of palpable pathology and the axillae are free of lymphadenopathy.    A chaperone was offered to the patient during today's exam. Chaperone name: Josevannessa Kelly was present.      Assessment & Plan:   35 y.o. female with the following -    1. Obesity (BMI 30-39.9)  This is a chronic, stable condition.  BMI today 34.  -dietary and exercise guidance given  -labs ordered    2. Allergic rhinitis, unspecified seasonality, unspecified trigger  This is a chronic, worsening condition despite zyrtec, benadryl and flonase.  She uses eye drops prn which do help improve eye " discomfort.  She would like referral back to allergy for immunotherapy.  Referral given.  Epipen Rx given due to hx of throat closing due to allergies in the past.  Follow-up and UCC/ER precautions given. Patient expressed understanding and agreement with plan.  - REFERRAL TO ALLERGY  - EPINEPHrine (EPIPEN 2-RYAN) 0.3 MG/0.3ML Solution Auto-injector solution for injection; 0.3 mL by Intramuscular route Once for 1 dose.  Dispense: 0.3 mL; Refill: 0    3. Sore armpit, left  This is a new condition, stable.  States she has had similar issues in the past that occur with lifting heavy objects and self resolve.  No skin changes or abnormalities noted on examination.  Discussed risks and benefits of getting imaging and patient would like to wait 2 weeks to see if symptoms resolve and if not, will call me to have imaging ordered.  Will do trial of stretching, avoiding heavy lifting and monitoring.  Pt to return in 2-4 weeks for pap smear and will re-evaluate at that time. Patient expressed understanding and agreement with plan.  - CBC WITH DIFFERENTIAL; Future  - Comp Metabolic Panel; Future  - Lipid Profile; Future  - HEMOGLOBIN A1C; Future    4. Need for vaccination  Pt is due for Tdap.  Risks and benefits discussed.  Vaccine given today  - Tdap =>6yo IM      Return in about 4 weeks (around 6/10/2020) for Annual plus pap.    Please note that this dictation was created using voice recognition software. I have made every reasonable attempt to correct obvious errors, but I expect that there are errors of grammar and possibly content that I did not discover before finalizing the note.

## 2020-05-13 ENCOUNTER — OFFICE VISIT (OUTPATIENT)
Dept: MEDICAL GROUP | Facility: MEDICAL CENTER | Age: 35
End: 2020-05-13
Payer: COMMERCIAL

## 2020-05-13 VITALS
WEIGHT: 221.4 LBS | HEIGHT: 67 IN | BODY MASS INDEX: 34.75 KG/M2 | SYSTOLIC BLOOD PRESSURE: 96 MMHG | DIASTOLIC BLOOD PRESSURE: 60 MMHG | HEART RATE: 68 BPM | TEMPERATURE: 98.4 F | OXYGEN SATURATION: 100 %

## 2020-05-13 DIAGNOSIS — E66.9 OBESITY (BMI 30-39.9): ICD-10-CM

## 2020-05-13 DIAGNOSIS — M79.622: ICD-10-CM

## 2020-05-13 DIAGNOSIS — J30.9 ALLERGIC RHINITIS, UNSPECIFIED SEASONALITY, UNSPECIFIED TRIGGER: ICD-10-CM

## 2020-05-13 DIAGNOSIS — Z23 NEED FOR VACCINATION: ICD-10-CM

## 2020-05-13 PROCEDURE — 99204 OFFICE O/P NEW MOD 45 MIN: CPT | Mod: 25 | Performed by: FAMILY MEDICINE

## 2020-05-13 PROCEDURE — 90471 IMMUNIZATION ADMIN: CPT | Performed by: FAMILY MEDICINE

## 2020-05-13 PROCEDURE — 90715 TDAP VACCINE 7 YRS/> IM: CPT | Performed by: FAMILY MEDICINE

## 2020-05-13 RX ORDER — EPINEPHRINE 0.3 MG/.3ML
0.3 INJECTION SUBCUTANEOUS ONCE
Qty: 0.3 ML | Refills: 0 | Status: SHIPPED | OUTPATIENT
Start: 2020-05-13 | End: 2020-05-13

## 2020-05-13 RX ORDER — DIPHENHYDRAMINE HCL 25 MG
25 TABLET ORAL EVERY 6 HOURS PRN
COMMUNITY

## 2020-05-13 RX ORDER — CETIRIZINE HYDROCHLORIDE 10 MG/1
10 TABLET ORAL DAILY
COMMUNITY

## 2020-05-13 SDOH — HEALTH STABILITY: MENTAL HEALTH: HOW MANY STANDARD DRINKS CONTAINING ALCOHOL DO YOU HAVE ON A TYPICAL DAY?: 1 OR 2

## 2020-05-13 SDOH — HEALTH STABILITY: MENTAL HEALTH: HOW OFTEN DO YOU HAVE A DRINK CONTAINING ALCOHOL?: 2-3 TIMES A WEEK

## 2020-05-13 ASSESSMENT — PATIENT HEALTH QUESTIONNAIRE - PHQ9: CLINICAL INTERPRETATION OF PHQ2 SCORE: 0

## 2020-05-13 NOTE — ASSESSMENT & PLAN NOTE
This is a new condition.  Onset: yesterday  Location: left armpit  Duration: constant, mild  Quality: dull  Modifying factors: no improvement with rest or stretching.   Precipitating trauma: none  Associated symptoms: no redness, swelling, discharge.  Home treatments: none.  States she has had similar issues in the past after lifting heavy objects.  States her right armpit occasionally has the same soreness.  No weakness, swelling, or skin changes.  She has not recently shaved the area

## 2020-05-13 NOTE — ASSESSMENT & PLAN NOTE
This is a chronic condition.  Onset: more than 10 years ago  Location: eyes, nose, sinus  Duration: constant  Modifying factors:  Some improvement with zyrtec and benadryl with less sneezing fits, however still gets itchy eyes occasionally and sneezing fits  Associated symptoms: sneezing, light cough, runny nose, itchy watery eyes, sinus pressure when symptoms are very severe.  Occasionally hives if direct contact with allergens.  States she had allergy testing in the past- trees, weeds, dog saliva, and some raw fruits and veggies.  States some foods such as celery make her throat feel like it is closing and oranges make her throat itchy.  Does not currently have an epi pen.  Denies ever needing intubation in the past.  She previously had immunotherapy about 10 years ago which helped improve symptoms, however symptoms are starting to reoccur more often and severely over the last 1-2 years  Home treatments: Zyrtec, benadryl daily.  She uses flonase about once per week.  Does not have SOB, so not using albuterol and no hx of asthma.

## 2020-06-08 ENCOUNTER — HOSPITAL ENCOUNTER (OUTPATIENT)
Dept: LAB | Facility: MEDICAL CENTER | Age: 35
End: 2020-06-08
Attending: FAMILY MEDICINE
Payer: COMMERCIAL

## 2020-06-08 DIAGNOSIS — M79.622: ICD-10-CM

## 2020-06-08 LAB
ALBUMIN SERPL BCP-MCNC: 4.3 G/DL (ref 3.2–4.9)
ALBUMIN/GLOB SERPL: 1.4 G/DL
ALP SERPL-CCNC: 61 U/L (ref 30–99)
ALT SERPL-CCNC: 17 U/L (ref 2–50)
ANION GAP SERPL CALC-SCNC: 10 MMOL/L (ref 7–16)
AST SERPL-CCNC: 15 U/L (ref 12–45)
BASOPHILS # BLD AUTO: 0.5 % (ref 0–1.8)
BASOPHILS # BLD: 0.03 K/UL (ref 0–0.12)
BILIRUB SERPL-MCNC: 0.3 MG/DL (ref 0.1–1.5)
BUN SERPL-MCNC: 11 MG/DL (ref 8–22)
CALCIUM SERPL-MCNC: 9.1 MG/DL (ref 8.4–10.2)
CHLORIDE SERPL-SCNC: 107 MMOL/L (ref 96–112)
CHOLEST SERPL-MCNC: 140 MG/DL (ref 100–199)
CO2 SERPL-SCNC: 26 MMOL/L (ref 20–33)
CREAT SERPL-MCNC: 0.88 MG/DL (ref 0.5–1.4)
EOSINOPHIL # BLD AUTO: 0.27 K/UL (ref 0–0.51)
EOSINOPHIL NFR BLD: 4.1 % (ref 0–6.9)
ERYTHROCYTE [DISTWIDTH] IN BLOOD BY AUTOMATED COUNT: 41.4 FL (ref 35.9–50)
EST. AVERAGE GLUCOSE BLD GHB EST-MCNC: 114 MG/DL
FASTING STATUS PATIENT QL REPORTED: NORMAL
GLOBULIN SER CALC-MCNC: 3.1 G/DL (ref 1.9–3.5)
GLUCOSE SERPL-MCNC: 95 MG/DL (ref 65–99)
HBA1C MFR BLD: 5.6 % (ref 0–5.6)
HCT VFR BLD AUTO: 42 % (ref 37–47)
HDLC SERPL-MCNC: 52 MG/DL
HGB BLD-MCNC: 13.4 G/DL (ref 12–16)
IMM GRANULOCYTES # BLD AUTO: 0.02 K/UL (ref 0–0.11)
IMM GRANULOCYTES NFR BLD AUTO: 0.3 % (ref 0–0.9)
LDLC SERPL CALC-MCNC: 78 MG/DL
LYMPHOCYTES # BLD AUTO: 2.52 K/UL (ref 1–4.8)
LYMPHOCYTES NFR BLD: 38.7 % (ref 22–41)
MCH RBC QN AUTO: 27.7 PG (ref 27–33)
MCHC RBC AUTO-ENTMCNC: 31.9 G/DL (ref 33.6–35)
MCV RBC AUTO: 87 FL (ref 81.4–97.8)
MONOCYTES # BLD AUTO: 0.54 K/UL (ref 0–0.85)
MONOCYTES NFR BLD AUTO: 8.3 % (ref 0–13.4)
NEUTROPHILS # BLD AUTO: 3.14 K/UL (ref 2–7.15)
NEUTROPHILS NFR BLD: 48.1 % (ref 44–72)
NRBC # BLD AUTO: 0 K/UL
NRBC BLD-RTO: 0 /100 WBC
PLATELET # BLD AUTO: 282 K/UL (ref 164–446)
PMV BLD AUTO: 8.4 FL (ref 9–12.9)
POTASSIUM SERPL-SCNC: 4.8 MMOL/L (ref 3.6–5.5)
PROT SERPL-MCNC: 7.4 G/DL (ref 6–8.2)
RBC # BLD AUTO: 4.83 M/UL (ref 4.2–5.4)
SODIUM SERPL-SCNC: 143 MMOL/L (ref 135–145)
TRIGL SERPL-MCNC: 50 MG/DL (ref 0–149)
WBC # BLD AUTO: 6.5 K/UL (ref 4.8–10.8)

## 2020-06-08 PROCEDURE — 80053 COMPREHEN METABOLIC PANEL: CPT

## 2020-06-08 PROCEDURE — 85025 COMPLETE CBC W/AUTO DIFF WBC: CPT

## 2020-06-08 PROCEDURE — 36415 COLL VENOUS BLD VENIPUNCTURE: CPT

## 2020-06-08 PROCEDURE — 83036 HEMOGLOBIN GLYCOSYLATED A1C: CPT

## 2020-06-08 PROCEDURE — 80061 LIPID PANEL: CPT

## 2020-07-05 NOTE — PROGRESS NOTES
Subjective:     CC:   Chief Complaint   Patient presents with   • Annual Exam   • Gynecologic Exam       HPI:   Allison Collins is a 35 y.o. female who presents for annual exam. She is feeling well and denies any complaints.    Ob-Gyn/ History:    Patient has GYN provider: no  /Para:  G0  Last Pap Smear:  About 7 years. no history of abnormal pap smears.  Gyn Surgery:  No.  Current Contraceptive Method:  No. She is currently sexually active.  Last menstrual period:  20.  Periods regular. light bleeding. Cramping is mild.   She does take OTC analgesics for cramps.  No significant bloating/fluid retention, pelvic pain, or dyspareunia. No vaginal discharge  Folate intake: good- takes a MV    Health Maintenance  Cholesterol Screening: up to date  Diabetes Screening: up to date   Diet: has been working to improve diet  Exercise: yes  Substance Abuse: none  Safe in relationship.  Seat belts, bike helmet, gun safety discussed.  Sun protection used.    Cancer screening  Cervical Cancer Screening: pap smear attempted today  Breast Cancer Screening: manual check today    Infectious disease screening/Immunizations  --STI Screening: declined  --Practices safe sex.  --HIV Screening: declined  --Immunizations:    Influenza: recommended in the fall   Pneumococcal : declined but will consider    She  has a past medical history of Allergic rhinitis (2020). She also has no past medical history of Asthma.  She  has a past surgical history that includes dental extraction(s).    Family History   Problem Relation Age of Onset   • Hypertension Mother    • Cancer Maternal Grandmother         breast cancer at 88   • Hypertension Maternal Grandmother    • Diabetes Maternal Grandmother        Social History     Socioeconomic History   • Marital status:      Spouse name: Not on file   • Number of children: Not on file   • Years of education: Not on file   • Highest education level: Not on file   Occupational  History   • Not on file   Social Needs   • Financial resource strain: Not on file   • Food insecurity     Worry: Not on file     Inability: Not on file   • Transportation needs     Medical: Not on file     Non-medical: Not on file   Tobacco Use   • Smoking status: Light Tobacco Smoker     Packs/day: 0.00     Types: Cigars   • Smokeless tobacco: Never Used   Substance and Sexual Activity   • Alcohol use: Yes     Frequency: 2-3 times a week     Drinks per session: 1 or 2   • Drug use: Not Currently   • Sexual activity: Yes     Partners: Female   Lifestyle   • Physical activity     Days per week: Not on file     Minutes per session: Not on file   • Stress: Not on file   Relationships   • Social connections     Talks on phone: Not on file     Gets together: Not on file     Attends [employer] service: Not on file     Active member of club or organization: Not on file     Attends meetings of clubs or organizations: Not on file     Relationship status: Not on file   • Intimate partner violence     Fear of current or ex partner: Not on file     Emotionally abused: Not on file     Physically abused: Not on file     Forced sexual activity: Not on file   Other Topics Concern   • Not on file   Social History Narrative   • Not on file       Patient Active Problem List    Diagnosis Date Noted   • Obesity (BMI 30-39.9) 05/13/2020   • Allergic rhinitis 05/13/2020   • Sore armpit, left 05/13/2020         Current Outpatient Medications   Medication Sig Dispense Refill   • cetirizine (ZYRTEC) 10 MG Tab Take 10 mg by mouth every day.     • diphenhydrAMINE (BENADRYL) 25 MG Tab Take 25 mg by mouth every 6 hours as needed for Sleep.     • albuterol 108 (90 Base) MCG/ACT Aero Soln inhalation aerosol Inhale 2 Puffs by mouth every 6 hours as needed. 8.5 g 0   • fluticasone (FLONASE) 50 MCG/ACT nasal spray Spray 1 Spray in nose every day. 16 g 0   • EPINEPHrine (EPIPEN) 0.3 MG/0.3ML Solution Auto-injector solution for injection 0.3 mg by  "Intramuscular route Once PRN.       No current facility-administered medications for this visit.      No Known Allergies    Review of Systems   Constitutional: Negative for fever, chills and malaise/fatigue.   HENT: Negative for congestion.    Eyes: Negative for pain.   Respiratory: Negative for cough and shortness of breath.    Cardiovascular: Negative for leg swelling.   Gastrointestinal: Negative for nausea, vomiting, abdominal pain and diarrhea.   Genitourinary: Negative for dysuria and hematuria.   Skin: Negative for rash.   Neurological: Negative for dizziness, focal weakness and headaches.   Endo/Heme/Allergies: Does not bruise/bleed easily.   Psychiatric/Behavioral: Negative for depression.  The patient is not nervous/anxious.      Objective:     BP (!) 94/68 (BP Location: Left arm, Patient Position: Sitting, BP Cuff Size: Adult)   Pulse 67   Temp 37.3 °C (99.1 °F) (Temporal)   Ht 1.702 m (5' 7\")   Wt 102.4 kg (225 lb 12 oz)   LMP 07/03/2020 (Approximate)   SpO2 97%   BMI 35.36 kg/m²   Body mass index is 35.36 kg/m².  Wt Readings from Last 4 Encounters:   07/06/20 102.4 kg (225 lb 12 oz)   05/13/20 100.4 kg (221 lb 6.4 oz)   01/23/20 97.3 kg (214 lb 9.6 oz)   01/12/20 95.7 kg (211 lb)       Physical Exam:  Constitutional: Well-developed and well-nourished. Not diaphoretic. No distress.   Skin: Skin is warm and dry. No rash noted.  Head: Atraumatic without lesions.  Eyes: Conjunctivae and extraocular motions are normal. Pupils are equal, round, and reactive to light. No scleral icterus.   Ears:  External ears unremarkable. Tympanic membranes clear and intact.  Nose: Pt is wearing a cloth face mask.  Mouth examination deferred given COVID19 exposure risk  Mouth/Throat: Pt is wearing a cloth face mask.  Mouth examination deferred given COVID19 exposure risk  Neck: Supple, trachea midline. Normal range of motion. No thyromegaly present. No lymphadenopathy--cervical or supraclavicular.  Cardiovascular: " Regular rate and rhythm, S1 and S2 without murmur, rubs, or gallops.  Lungs: Normal inspiratory effort, CTA bilaterally, no wheezes/rhonchi/rales  Breast: Breasts examined seated and supine. No skin changes, peau d'orange or nipple retraction. No discharge. No axillary or supraclavicular adenopathy. No masses or nodularity palpable.  Abdomen: Soft, non tender, and without distention. Active bowel sounds in all four quadrants. No rebound, guarding, masses or HSM.  :Perineum and external genitalia normal without rash. Unable to perform pelvic speculum or manual examinations due to patient discomfort.  Extremities: No cyanosis, clubbing, erythema, nor edema. Distal pulses intact and symmetric.   Musculoskeletal: All major joints AROM full in all directions without pain.  Neurological: Alert and oriented x 3. Normal gait.  Psychiatric:  Behavior, mood, and affect are appropriate.    A chaperone was offered to the patient during today's exam. Chaperone name: Thelma Mendoza was present.    Assessment and Plan:     1. Well woman exam with routine gynecological exam  REFERRAL TO OB/GYN       HCM:  Pt declined pneumonia vaccine.  Smoking cessation counseling given and pt will consider MA visit for vaccination in the future.  Unable to obtain pap smear today, gyn referral given for smaller speculum to be used.  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    Follow-up: Return in about 1 year (around 7/6/2021) for Annual.

## 2020-07-06 ENCOUNTER — OFFICE VISIT (OUTPATIENT)
Dept: MEDICAL GROUP | Facility: MEDICAL CENTER | Age: 35
End: 2020-07-06
Payer: COMMERCIAL

## 2020-07-06 VITALS
HEART RATE: 67 BPM | WEIGHT: 225.75 LBS | SYSTOLIC BLOOD PRESSURE: 94 MMHG | BODY MASS INDEX: 35.43 KG/M2 | DIASTOLIC BLOOD PRESSURE: 68 MMHG | HEIGHT: 67 IN | TEMPERATURE: 99.1 F | OXYGEN SATURATION: 97 %

## 2020-07-06 DIAGNOSIS — Z01.419 WELL WOMAN EXAM WITH ROUTINE GYNECOLOGICAL EXAM: ICD-10-CM

## 2020-07-06 PROCEDURE — 99395 PREV VISIT EST AGE 18-39: CPT | Performed by: FAMILY MEDICINE

## 2020-07-06 RX ORDER — EPINEPHRINE 0.3 MG/.3ML
0.3 INJECTION SUBCUTANEOUS
COMMUNITY
Start: 2020-05-15

## 2020-07-06 ASSESSMENT — FIBROSIS 4 INDEX: FIB4 SCORE: 0.44

## 2020-09-28 ENCOUNTER — OFFICE VISIT (OUTPATIENT)
Dept: MEDICAL GROUP | Facility: MEDICAL CENTER | Age: 35
End: 2020-09-28
Payer: COMMERCIAL

## 2020-09-28 VITALS
BODY MASS INDEX: 35.55 KG/M2 | OXYGEN SATURATION: 98 % | WEIGHT: 226.52 LBS | DIASTOLIC BLOOD PRESSURE: 78 MMHG | HEIGHT: 67 IN | SYSTOLIC BLOOD PRESSURE: 122 MMHG | TEMPERATURE: 96.5 F | HEART RATE: 70 BPM | RESPIRATION RATE: 16 BRPM

## 2020-09-28 DIAGNOSIS — G89.29 CHRONIC PAIN OF BOTH KNEES: ICD-10-CM

## 2020-09-28 DIAGNOSIS — M25.562 CHRONIC PAIN OF BOTH KNEES: ICD-10-CM

## 2020-09-28 DIAGNOSIS — M25.561 CHRONIC PAIN OF BOTH KNEES: ICD-10-CM

## 2020-09-28 DIAGNOSIS — Z78.9 VARICELLA VACCINATION STATUS UNKNOWN: ICD-10-CM

## 2020-09-28 DIAGNOSIS — M21.41 FLAT FEET, BILATERAL: ICD-10-CM

## 2020-09-28 DIAGNOSIS — Z23 NEED FOR VACCINATION: ICD-10-CM

## 2020-09-28 DIAGNOSIS — M21.42 FLAT FEET, BILATERAL: ICD-10-CM

## 2020-09-28 PROCEDURE — 90471 IMMUNIZATION ADMIN: CPT | Performed by: FAMILY MEDICINE

## 2020-09-28 PROCEDURE — 90686 IIV4 VACC NO PRSV 0.5 ML IM: CPT | Performed by: FAMILY MEDICINE

## 2020-09-28 PROCEDURE — 99213 OFFICE O/P EST LOW 20 MIN: CPT | Mod: 25 | Performed by: FAMILY MEDICINE

## 2020-09-28 ASSESSMENT — FIBROSIS 4 INDEX: FIB4 SCORE: 0.44

## 2020-09-28 NOTE — PROGRESS NOTES
Subjective:     CC: knee pain    HPI:   Allison presents today with     Chronic pain of both knees  This is a chronic condition.  Onset: 8-10 years  Location: bilateral knees  Duration:  Intermittent, lasts about 1-3 days  Quality: feels like both knees are inflammed on the medial and lateral sides, dull achy pain and occasional sharp pain  Modifying factors: improves with warm packs  Precipitating trauma: none.  She does crossfit and played football in the past  Associated symptoms: gets swelling, pain.  Symptoms occur with movement and at rest.  Symptoms worsen on the left with twisting of the knee.  Home treatments: warm packs.  Rarely uses nsaids.  Mostly improves with rest and elevation.    Symptoms were worse about 3 days ago but are improving.        Past Medical History:   Diagnosis Date   • Allergic rhinitis 5/13/2020       Social History     Tobacco Use   • Smoking status: Light Tobacco Smoker     Packs/day: 0.00     Types: Cigars   • Smokeless tobacco: Never Used   Substance Use Topics   • Alcohol use: Yes     Frequency: 2-3 times a week     Drinks per session: 1 or 2   • Drug use: Not Currently       Current Outpatient Medications Ordered in Epic   Medication Sig Dispense Refill   • EPINEPHrine (EPIPEN) 0.3 MG/0.3ML Solution Auto-injector solution for injection 0.3 mg by Intramuscular route Once PRN.     • cetirizine (ZYRTEC) 10 MG Tab Take 10 mg by mouth every day.     • diphenhydrAMINE (BENADRYL) 25 MG Tab Take 25 mg by mouth every 6 hours as needed for Sleep.     • albuterol 108 (90 Base) MCG/ACT Aero Soln inhalation aerosol Inhale 2 Puffs by mouth every 6 hours as needed. 8.5 g 0   • fluticasone (FLONASE) 50 MCG/ACT nasal spray Spray 1 Spray in nose every day. 16 g 0     No current Epic-ordered facility-administered medications on file.        Allergies:  Patient has no known allergies.    Health Maintenance: flu shot given today    ROS:  Gen: no fevers/chills, no changes in weight  Eyes: no changes  "in vision  ENT: no sore throat, no hearing loss, no bloody nose  Pulm: no sob, no cough  CV: no chest pain, no palpitations  GI: no nausea/vomiting, no diarrhea  : no dysuria  MSk: no myalgias  Skin: no rash  Neuro: no headaches, no numbness/tingling  Heme/Lymph: no easy bruising      Objective:       Exam:  /78 (BP Location: Left arm, Patient Position: Sitting, BP Cuff Size: Adult)   Pulse 70   Temp 35.8 °C (96.5 °F) (Temporal)   Resp 16   Ht 1.702 m (5' 7\")   Wt 102.7 kg (226 lb 8.4 oz)   LMP 09/26/2020   SpO2 98%   BMI 35.48 kg/m²  Body mass index is 35.48 kg/m².    Gen: Alert and oriented, No apparent distress.  Neck: Neck is supple without lymphadenopathy.  Lungs: Normal effort, CTA bilaterally, no wheezes, rhonchi, or rales  CV: Regular rate and rhythm. No murmurs, rubs, or gallops.  Ext: No clubbing, cyanosis, edema.  Bilateral Knees: Full ROM, full strength, TTP none, edema none, varus/valgus stress negative, anterior/posterior drawer negative, Lachman's negative      Assessment & Plan:     35 y.o. female with the following -     1. Chronic pain of both knees  Chronic, stable, intermittent.  Most likely OA since she gets swelling and pain that worsen with weather changes.  Possibly due to flat feet.  Will check images.  Recommendations for flat feet per below. Will consider PT vs injections based on results.  IBU 200mg-800mg daily with food prn pain.  Se profile discussed.  OTC tylenol prn. Follow-up precautions given  - DX-KNEE COMPLETE 4+ LEFT; Future  - DX-KNEE COMPLETE 4+ RIGHT; Future    2. Flat feet, bilateral  Chronic, stable.  Encouraged OTC arch supports.  If no improvement, will refer to podiatry for shoe inserts    3. Need for vaccination  Pt desires vaccination.  Risks and benefits discussed.  No contraindications today.  Vaccine given.  Follow-up precautions discussed.  - Influenza Vaccine Quad Injection (PF)    4. Varicella vaccination status unknown  Pt's wife is pregnant.  The " patient would like to know if she needs chickenpox vaccine, since she does not think she's had it and no hx of chicken pox infection.  Will check for AB and plan to vaccinate after her wife delivers if indicated.  - VARICELLA ZOSTER IGG AB; Future      Return if symptoms worsen or fail to improve.    Please note that this dictation was created using voice recognition software. I have made every reasonable attempt to correct obvious errors, but I expect that there are errors of grammar and possibly content that I did not discover before finalizing the note.

## 2020-09-28 NOTE — ASSESSMENT & PLAN NOTE
This is a chronic condition.  Onset: 8-10 years  Location: bilateral knees  Duration:  Intermittent, lasts about 1-3 days  Quality: feels like both knees are inflammed on the medial and lateral sides, dull achy pain and occasional sharp pain  Modifying factors: improves with warm packs  Precipitating trauma: none.  She does crossfit and played football in the past  Associated symptoms: gets swelling, pain.  Symptoms occur with movement and at rest.  Symptoms worsen on the left with twisting of the knee.  Home treatments: warm packs.  Rarely uses nsaids.  Mostly improves with rest and elevation.    Symptoms were worse about 3 days ago but are improving.

## 2020-10-12 ENCOUNTER — HOSPITAL ENCOUNTER (OUTPATIENT)
Dept: RADIOLOGY | Facility: MEDICAL CENTER | Age: 35
End: 2020-10-12
Attending: FAMILY MEDICINE
Payer: COMMERCIAL

## 2020-10-12 DIAGNOSIS — G89.29 CHRONIC PAIN OF BOTH KNEES: ICD-10-CM

## 2020-10-12 DIAGNOSIS — M25.561 CHRONIC PAIN OF BOTH KNEES: ICD-10-CM

## 2020-10-12 DIAGNOSIS — M25.562 CHRONIC PAIN OF BOTH KNEES: ICD-10-CM

## 2020-10-12 PROCEDURE — 73562 X-RAY EXAM OF KNEE 3: CPT | Mod: LT

## 2020-10-12 PROCEDURE — 73562 X-RAY EXAM OF KNEE 3: CPT | Mod: RT

## 2020-10-22 ENCOUNTER — TELEPHONE (OUTPATIENT)
Dept: MEDICAL GROUP | Facility: MEDICAL CENTER | Age: 35
End: 2020-10-22

## 2020-11-06 NOTE — OP THERAPY EVALUATION
Outpatient Physical Therapy  INITIAL EVALUATION    [facility] Physical Therapy [address] [facility], [address]  [place] [address]  Phone:  [phone]    Date of Evaluation: 2020    Patient: Allison Collins  YOB: 1985  MRN: [id number]     Referring Provider: Radha Li D.O.  [address]  [address]  [place] [address]   Referring Diagnosis Chronic pain of both knees [M25.561, M25.562, G89.29]     Time Calculation    Start time: 0800  Stop time: 0900 Time Calculation (min): 60 minutes         Chief Complaint: Knee Problem    Visit Diagnoses     ICD-10-CM   1. Chronic pain of both knees  M25.561    M25.562    G89.29         Subjective:   History of Present Illness:     Date of onset:  10/12/2020    Mechanism of injury:  The patient is a 35 year old female who reports bilateral knee pain for the past few years. She feels swelling and inflammation after activity, squatting working out at the gym. The patient has not had any treatment in the past for her condition, but as the years have past and she has become more active she is having more pain. When she is lounging on a couch with legs extended she notices pain but not sitting with knees bent in a chair.  Quality of life:  Good  Sleep disturbance:  Interrupted sleep  # Times/Night awakened:  2  Pain:     Current pain ratin    At best pain ratin    At worst pain ratin    Quality:  Dull ache and sharp    Pain timing:  Intermittent    Relieving factors:  Pain medication, change in position and heating pad    Aggravating factors:  Bending, squatting and stair climbing    Pain Comments::  Increaed running in the cold weather ; jumping activity causes more pain at times.  Social Support:     Lives in:  Multiple-level home  Hand dominance:  Right  Diagnostic Tests:     X-ray: normal    Patient Goals:     Other patient goals:  To be able to decrease pain during workouts; less pain with box jumps and increase  mobiltiy       Past Medical History:   Diagnosis Date   • Allergic rhinitis 5/13/2020     Past Surgical History:   Procedure Laterality Date   • DENTAL EXTRACTION(S)      wisdom teeth extraction     Social History     Tobacco Use   • Smoking status: Light Tobacco Smoker     Packs/day: 0.00     Types: Cigars   • Smokeless tobacco: Never Used   Substance Use Topics   • Alcohol use: Yes     Frequency: 2-3 times a week     Drinks per session: 1 or 2     Family and Occupational History     Socioeconomic History   • Marital status:      Spouse name: Not on file   • Number of children: Not on file   • Years of education: Not on file   • Highest education level: Not on file   Occupational History   • Not on file       Objective     Static Posture     Knee   Genu valgus.     Observations     Additional Observation Details  Increased foot pronation during gait pattern     Tenderness   Left Knee   No tenderness in the patellar tendon and quadriceps tendon.     Right Knee   No tenderness in the patellar tendon and quadriceps tendon.     Active Range of Motion   Left Knee   Normal active range of motion    Right Knee   Normal active range of motion    Additional Active Range of Motion Details  Slight increase in knee extension bilaterally but no pain     Strength:      Left Knee   Normal strength    Right Knee   Normal strength    Tests     Left Knee   Negative lateral Leigha, medial Leigha and patellar apprehension.     Right Knee   Negative lateral Leigha, medial Leigha and patellar apprehension.   Ambulation   Weight-Bearing Status   Assistive device used: none    Ambulation: Level Surfaces   Ambulation with assistive device: independent  Ambulation without assistive device: independent    Observational Gait   Walking speed, stride length, left stance time, right stance time, left swing time, right swing time, left step length and right step length within functional limits. Stride width: WFL.    Left foot  contact pattern: foot flat  Right foot contact pattern: foot flat  Left arm swing: within functional limits  Right arm swing: within functional limits  Base of support: normal    Additional Observational Gait Details  Observed slight valgus bilaterally in knees with increases pronation low arch support to feet.    Functional Assessment   Squat   Left valgus and right valgus.     Single Leg Squat   Left Leg  Valgus.     Right Leg  Valgus.     Single Leg Stance   Left: 15 seconds  Right: 15 seconds        Therapeutic Exercises (CPT 70489):     1. Leg extensions    2. Sidelying hip abductions, 20x each side     3. Quad sets with SLR's, 20x each side     4. Bridges       Time-based treatments/modalities:    Physical Therapy Timed Treatment Charges  Therapeutic exercise minutes (CPT 92851): 10 minutes      Assessment, Response and Plan:   Impairments: activity intolerance, impaired functional mobility and lacks appropriate home exercise program    Assessment details:  The patient is a 35 year old female who reports bilateral knee pain following activity more specifically while at the gym doing squats of stair-climbing.  She has difficulty with doing box jumps as well while at the gym. Observed patient to have increased foot pronation and slight valgus positioning at the knees during gait. She has increased valgus during squatting motions. The patient has good overall strength and AROM bilaterally; has increased difficulty bending functionally into deep squat positions and returning without some pain. The patient would benefit from skilled physical therapy to address strength and conditioning and functional mobility, activity tolerance for the LE's. The patient would also benefit from OTC orthotics for low arches and flat feet.  Barriers to therapy:  None  Prognosis: good    Goals:   Short Term Goals:   1) Indep with HEP  2) Donovan to bend and retreive objects with 50% less knee pain  3) Decrease swelling and information 50%  or more after activity   Short term goal time span:  2-4 weeks      Long Term Goals:    1) Progression/regression advancing HEP   2) Performs squats at the gym with 1-2 levels less pain on VAS  3) Able to perform box jumps at gym  Long term goal time span:  4-6 weeks    Plan:   Therapy options:  Physical therapy treatment to continue  Planned therapy interventions:  E Stim Unattended (CPT 62013), Functional Training, Self Care (CPT 23972), Manual Therapy (CPT 32359), Neuromuscular Re-education (CPT 95202), Self Care ADL Training (CPT 29562), Therapeutic Activities (CPT 19794) and Therapeutic Exercise (CPT 45869)  Frequency:  2x week  Duration in weeks:  6  Duration in visits:  12  Discussed with:  Patient      Functional Assessment Used        Referring provider co-signature:  I have reviewed this plan of care and my co-signature certifies the need for services.    Certification Period: 11/09/2020 to  12/21/20    Physician Signature: ________________________________ Date: ______________

## 2020-11-09 ENCOUNTER — PHYSICAL THERAPY (OUTPATIENT)
Dept: PHYSICAL THERAPY | Facility: REHABILITATION | Age: 35
End: 2020-11-09
Attending: FAMILY MEDICINE
Payer: COMMERCIAL

## 2020-11-09 DIAGNOSIS — G89.29 CHRONIC PAIN OF BOTH KNEES: ICD-10-CM

## 2020-11-09 DIAGNOSIS — M25.562 CHRONIC PAIN OF BOTH KNEES: ICD-10-CM

## 2020-11-09 DIAGNOSIS — M25.561 CHRONIC PAIN OF BOTH KNEES: ICD-10-CM

## 2020-11-09 PROCEDURE — 97110 THERAPEUTIC EXERCISES: CPT

## 2020-11-09 PROCEDURE — 97162 PT EVAL MOD COMPLEX 30 MIN: CPT

## 2020-11-09 SDOH — ECONOMIC STABILITY: GENERAL: QUALITY OF LIFE: GOOD

## 2020-11-09 ASSESSMENT — ENCOUNTER SYMPTOMS
EXACERBATED BY: SQUATTING
ALLEVIATING FACTORS: CHANGE IN POSITION
ALLEVIATING FACTORS: PAIN MEDICATION
QUALITY: SHARP
PAIN TIMING: INTERMITTENT
AWAKENINGS PER NIGHT: 2
EXACERBATED BY: STAIR CLIMBING
ALLEVIATING FACTORS: HEATING PAD
PAIN SCALE AT LOWEST: 0
PAIN SCALE AT HIGHEST: 7
EXACERBATED BY: BENDING
QUALITY: DULL ACHE
PAIN SCALE: 0

## 2020-11-09 ASSESSMENT — ACTIVITIES OF DAILY LIVING (ADL)
AMBULATION_WITH_ASSISTIVE_DEVICE: INDEPENDENT
AMBULATION_WITHOUT_ASSISTIVE_DEVICE: INDEPENDENT

## 2020-11-12 ENCOUNTER — APPOINTMENT (OUTPATIENT)
Dept: PHYSICAL THERAPY | Facility: REHABILITATION | Age: 35
End: 2020-11-12
Attending: FAMILY MEDICINE
Payer: COMMERCIAL

## 2020-12-07 ENCOUNTER — PHYSICAL THERAPY (OUTPATIENT)
Dept: PHYSICAL THERAPY | Facility: REHABILITATION | Age: 35
End: 2020-12-07
Attending: FAMILY MEDICINE
Payer: COMMERCIAL

## 2020-12-07 DIAGNOSIS — M25.561 CHRONIC PAIN OF BOTH KNEES: ICD-10-CM

## 2020-12-07 DIAGNOSIS — G89.29 CHRONIC PAIN OF BOTH KNEES: ICD-10-CM

## 2020-12-07 DIAGNOSIS — M25.562 CHRONIC PAIN OF BOTH KNEES: ICD-10-CM

## 2020-12-07 PROCEDURE — 97140 MANUAL THERAPY 1/> REGIONS: CPT

## 2020-12-07 PROCEDURE — 97110 THERAPEUTIC EXERCISES: CPT

## 2020-12-07 NOTE — OP THERAPY DAILY TREATMENT
Outpatient Physical Therapy  DAILY TREATMENT     [facility] Outpatient Physical Therapy [address] [facility], [address]  [place] [address]  Phone:  [phone]    Date: 12/07/2020    Patient: Allison Collins  YOB: 1985  MRN: [id number]     Time Calculation    Start time: 0130  Stop time: 0200 Time Calculation (min): 30 minutes         Chief Complaint: Knee Problem    Visit #: 2    SUBJECTIVE:  The patient reports having pain in both knees this weekend but worse to the (L) side more recently    OBJECTIVE:  Current objective measures:       Increase pain free AROM in knee flexion/ extension bilaterally     Therapeutic Exercises (CPT 59985):     1. Leg extensions    2. Sidelying hip abductions, 20x each side     3. Quad sets with SLR's, 20x each side     4. Bridges w/wo ball , 10 x    5. Upright bike     6. Hamstring curls    7. Shuttle leg press    8. Clamshells    Therapeutic Treatments and Modalities:     1. Manual Therapy (CPT 14534), Bilateral LE's, A/P joint mobs to increase flexion/extension     Time-based treatments/modalities:    Physical Therapy Timed Treatment Charges  Manual therapy minutes (CPT 36566): 10 minutes  Therapeutic exercise minutes (CPT 92876): 20 minutes    ASSESSMENT:   Response to treatment:     PROM bilaterally both knee in flexion and extension without pain; patient performed trunk stability exercises with no pain over either knee and completed movement with good motor and neuromuscular control.     PLAN/RECOMMENDATIONS:   Plan for treatment: continue with current treatment  Planned interventions for next visit: continue with current treatment.

## 2020-12-31 NOTE — OP THERAPY DAILY TREATMENT
"  Outpatient Physical Therapy  DAILY TREATMENT     [facility] Physical Therapy [address] [facility], [address]  [place] [address]  Phone:  [phone]    Date: 01/04/2021    Patient: Allison Collins  YOB: 1985  MRN: [id number]     Time Calculation    Start time: 0930  Stop time: 1000 Time Calculation (min): 30 minutes         Chief Complaint: Knee Problem    Visit #: 3    SUBJECTIVE:  The patient reports being off work for 2 weeks and not doing much activity. She has no pain this morning currently, however, but feels that her knees need conditioning.     OBJECTIVE:  Current objective measures:       Increase strength to the quads and hamstring muscles    Therapeutic Exercises (CPT 11616):     1. Leg extensions (green tb), 20x each side     2. Sidelying hip abductions, 20x each side     3. Quad sets with SLR's, 20x each side     4. Bridges w/wo ball , 10 x    5. Upright bike  (seat 6) , L4 at 5 minutes     6. Hamstring curls    7. Shuttle leg press    8. Clamshells    9. 7 \" steps , 10x each side    Therapeutic Treatments and Modalities:     1. Neuromuscular Re-education (CPT 27274), Bilateral LE's, unilateral and double stance activity over flat surface; stepovers with occasioinal support but not required over either LE    Time-based treatments/modalities:    Physical Therapy Timed Treatment Charges  Neuromusc re-ed, balance, coor, post minutes (CPT 95362): 10 minutes  Therapeutic exercise minutes (CPT 21451): 20 minutes    ASSESSMENT:   Response to treatment:   Performed strengthening exercises on leg press using increasing resistance conditioning quads and hamstrings; the patient demonstrated good motor control with step ups and lateral steps over 7\" step. Patient did not have any pain during activity to either knee with static or dynamic motions.     PLAN/RECOMMENDATIONS:   Plan for treatment: therapy treatment to continue next visit.  Planned interventions for next visit: continue with " current treatment.

## 2021-01-04 ENCOUNTER — PHYSICAL THERAPY (OUTPATIENT)
Dept: PHYSICAL THERAPY | Facility: REHABILITATION | Age: 36
End: 2021-01-04
Attending: FAMILY MEDICINE
Payer: COMMERCIAL

## 2021-01-04 DIAGNOSIS — M25.561 CHRONIC PAIN OF BOTH KNEES: ICD-10-CM

## 2021-01-04 DIAGNOSIS — G89.29 CHRONIC PAIN OF BOTH KNEES: ICD-10-CM

## 2021-01-04 DIAGNOSIS — M25.562 CHRONIC PAIN OF BOTH KNEES: ICD-10-CM

## 2021-01-04 PROCEDURE — 97110 THERAPEUTIC EXERCISES: CPT

## 2021-01-04 PROCEDURE — 97112 NEUROMUSCULAR REEDUCATION: CPT

## 2021-01-15 ENCOUNTER — APPOINTMENT (OUTPATIENT)
Dept: PHYSICAL THERAPY | Facility: REHABILITATION | Age: 36
End: 2021-01-15
Attending: FAMILY MEDICINE
Payer: COMMERCIAL

## 2021-01-18 ENCOUNTER — APPOINTMENT (OUTPATIENT)
Dept: PHYSICAL THERAPY | Facility: REHABILITATION | Age: 36
End: 2021-01-18
Attending: FAMILY MEDICINE
Payer: COMMERCIAL

## 2021-01-18 NOTE — OP THERAPY DAILY TREATMENT
"  Outpatient Physical Therapy  DAILY TREATMENT     [facility] Outpatient Physical Therapy [address], [address]  [place] [address]  Phone:  [phone]    Date: 01/18/2021    Patient: Allison Collins  YOB: 1985  MRN: [id number]     Time Calculation                   Chief Complaint: No chief complaint on file.    Visit #: 4    SUBJECTIVE:      OBJECTIVE:  Current objective measures:           Therapeutic Exercises (CPT 80232):     1. Leg extensions (green tb), 20x each side     2. Sidelying hip abductions, 20x each side     3. Quad sets with SLR's, 20x each side     4. Bridges w/wo ball , 10 x    5. Upright bike  (seat 6) , L4 at 5 minutes     6. Hamstring curls    7. Shuttle leg press    8. Clamshells    9. 7 \" steps , 10x each side    Therapeutic Treatments and Modalities:     1. Neuromuscular Re-education (CPT 02918), Bilateral LE's, unilateral and double stance activity over flat surface; stepovers with occasioinal support but not required over either LE    Time-based treatments/modalities:               ASSESSMENT:   Response to treatment:     PLAN/RECOMMENDATIONS:   Plan for treatment: continue with current treatment   Planned interventions for next visit: continue with current treatment.       "

## 2021-01-29 DIAGNOSIS — G89.29 CHRONIC PAIN OF BOTH KNEES: ICD-10-CM

## 2021-01-29 DIAGNOSIS — M21.41 FLAT FEET, BILATERAL: ICD-10-CM

## 2021-01-29 DIAGNOSIS — M25.561 CHRONIC PAIN OF BOTH KNEES: ICD-10-CM

## 2021-01-29 DIAGNOSIS — M25.562 CHRONIC PAIN OF BOTH KNEES: ICD-10-CM

## 2021-01-29 DIAGNOSIS — M21.42 FLAT FEET, BILATERAL: ICD-10-CM

## 2021-01-29 NOTE — PROGRESS NOTES
Pt continues to have knee pain.  Given her flat feet are likely worsening her symptoms, I have placed a podiatry referral for her for evaluation and treatment of her flat feet. Message sent to patient to update her. -Dr. Radha Li

## 2021-04-25 NOTE — PROGRESS NOTES
Subjective:     CC:   Chief Complaint   Patient presents with   • Annual Exam   • Other     Referral to allergist       HPI:   Allison Collins is a 36 y.o. female who presents for annual exam    Nausea  Chronic issue that occurs about every 3 months without clear trigger.  Has associated diarrhea, stomach upset.  No cramping, blood in stools or odd looking stools.  She takes pepto or tums and hydrates and symptoms resolve.      Patient has GYN provider: No   Last Pap Smear: No  H/O Abnormal Pap: No  Last Mammogram: plan to start mammograms at 41  Last Tdap: 2020    Exercise: no regular exercise, sedentary  Diet: eats out often but does some meal prepping      No LMP recorded.  Hx STDs: No  Birth control: none  Menses every month with about 28 days with moderate bleeding.  Reports mild cramping and does take OTC analgesics for cramps.  No significant bloating/fluid retention, pelvic pain, or dyspareunia. No abnormal vaginal discharge.  No breast tenderness, mass, nipple discharge, changes in size or contour, or abnormal cyclic discomfort.    OB History   No obstetric history on file.      She  reports being sexually active and has had partner(s) who are Female.    She  has a past medical history of Allergic rhinitis (5/13/2020). She also has no past medical history of Asthma.  She  has a past surgical history that includes dental extraction(s).    Family History   Problem Relation Age of Onset   • Hypertension Mother    • Cancer Maternal Grandmother         breast cancer at 88   • Hypertension Maternal Grandmother    • Diabetes Maternal Grandmother      Social History     Tobacco Use   • Smoking status: Light Tobacco Smoker     Packs/day: 0.00     Types: Cigars   • Smokeless tobacco: Never Used   Substance Use Topics   • Alcohol use: Yes   • Drug use: Not Currently       Patient Active Problem List    Diagnosis Date Noted   • Vegetarian diet 04/26/2021   • Nausea 04/26/2021   • Chronic pain of both knees  "09/28/2020   • Flat feet, bilateral 09/28/2020   • Obesity (BMI 30-39.9) 05/13/2020   • Allergic rhinitis 05/13/2020   • Sore armpit, left 05/13/2020     Current Outpatient Medications   Medication Sig Dispense Refill   • ondansetron (ZOFRAN) 4 MG Tab tablet Take 1 tablet by mouth every four hours as needed for Nausea/Vomiting for up to 30 doses. 30 tablet 0   • montelukast (SINGULAIR) 10 MG Tab Take 10 mg by mouth.     • EPINEPHrine (EPIPEN) 0.3 MG/0.3ML Solution Auto-injector solution for injection 0.3 mg by Intramuscular route Once PRN.     • cetirizine (ZYRTEC) 10 MG Tab Take 10 mg by mouth every day.     • diphenhydrAMINE (BENADRYL) 25 MG Tab Take 25 mg by mouth every 6 hours as needed for Sleep.     • albuterol 108 (90 Base) MCG/ACT Aero Soln inhalation aerosol Inhale 2 Puffs by mouth every 6 hours as needed. 8.5 g 0   • fluticasone (FLONASE) 50 MCG/ACT nasal spray Spray 1 Spray in nose every day. 16 g 0     No current facility-administered medications for this visit.     Allergies   Allergen Reactions   • Latex      Hives       Review of Systems   Constitutional: Negative for fever, chills and malaise/fatigue.   HENT: Negative for congestion.    Eyes: Negative for pain.   Respiratory: Negative for cough and shortness of breath.    Cardiovascular: Negative for chest pain and leg swelling.   Gastrointestinal: Negative abdominal pain and diarrhea.   Genitourinary: Negative for dysuria and hematuria.   Skin: Negative for rash.   Neurological: Negative for dizziness, focal weakness and headaches.   Endo/Heme/Allergies: Does not bruise/bleed easily.   Psychiatric/Behavioral: Negative for depression.  The patient is not nervous/anxious.      Objective:   /70 (BP Location: Right arm, Patient Position: Sitting, BP Cuff Size: Adult)   Pulse 84   Temp 37.2 °C (99 °F) (Temporal)   Resp 12   Ht 1.702 m (5' 7\")   Wt 104 kg (229 lb)   SpO2 97%   BMI 35.87 kg/m²     Wt Readings from Last 4 Encounters:   04/26/21 " 104 kg (229 lb)   09/28/20 103 kg (226 lb 8.4 oz)   07/06/20 102 kg (225 lb 12 oz)   05/13/20 100 kg (221 lb 6.4 oz)       A chaperone was offered to the patient during today's exam. Chaperone name: Iram Martinez was present.    Physical Exam:  Constitutional: Well-developed and well-nourished. Not diaphoretic. No distress.   Skin: Skin is warm and dry. No rash noted.  Head: Atraumatic without lesions.  Eyes: Conjunctivae and extraocular motions are normal. Pupils are equal, round, and reactive to light. No scleral icterus.   Ears:  External ears unremarkable. Tympanic membranes clear and intact.  Nose/mouth/throat: nasal and OP examinations deferred given COVID19 exposure risk  Neck: Supple, trachea midline. Normal range of motion. No thyromegaly present. No lymphadenopathy--cervical or supraclavicular.  Cardiovascular: Regular rate and rhythm, S1 and S2 without murmur, rubs, or gallops.    Respiratory: Effort normal. Clear to auscultation throughout. No adventitious sounds.   Breast: Breasts examined seated and supine. No skin changes, peau d'orange or nipple retraction. No discharge. No axillary or supraclavicular adenopathy. No masses or nodularity palpable.  Abdomen: Soft, non tender, and without distention. Active bowel sounds in all four quadrants. No rebound, guarding, masses or HSM.  Extremities: No cyanosis, clubbing, erythema, nor edema. Distal pulses intact and symmetric.   Musculoskeletal: All major joints AROM full in all directions without pain.  Neurological: Alert and oriented x 3. Grossly non-focal. Strength and sensation grossly intact.  Psychiatric:  Behavior, mood, and affect are appropriate.    Assessment and Plan:     1. Well woman exam (no gynecological exam)  Patient is doing well.  Referral to gyn for pap smear given difficult pelvic examination last year.  Labs ordered.  Hm per below.  - REFERRAL TO OB/GYN  - Comp Metabolic Panel; Future  - Lipid Profile; Future  - VITAMIN B12;  Future    2. Need for vaccination  Pt desires vaccination.  Risks and benefits discussed.  No contraindications today.  Vaccine given.  Follow-up precautions discussed.  - Pneumovax Vaccine (PPSV23)    3. Vegetarian diet  Patient eats a vegetarian diet.  Will monitor her b12 levels.  - Comp Metabolic Panel; Future  - Lipid Profile; Future  - VITAMIN B12; Future    4. Allergic rhinitis, unspecified seasonality, unspecified trigger  Chronic issue, stable with continued symptoms intermittently despite oral antihistamines and flonase. Referral for follow-up with allergy for immunotherapy placed.    5. Nausea  Intermittent issue that occurs about once every 3 months without clear cause. She works as a , but given her rare symptoms will hold stool testing at this time. Zofran Rx given (SE profile) and recommended food journal ing and follow-up precautions. Patient expressed understanding and agreement with plan.  - ondansetron (ZOFRAN) 4 MG Tab tablet; Take 1 tablet by mouth every four hours as needed for Nausea/Vomiting for up to 30 doses.  Dispense: 30 tablet; Refill: 0    6. Varicella vaccination status unknown  The patient would like to know if she needs chickenpox vaccine, since she does not think she's had it and no hx of chicken pox infection.  Will check for AB and plan to vaccinate her if needed.  - VARICELLA ZOSTER IGG AB; Future      Health maintenance:     Labs per orders  Immunizations per orders  Patient counseled about skin care, diet, supplements, and exercise.  Discussed  breast self exam     Follow-up: Return in about 1 year (around 4/26/2022) for Annual.

## 2021-04-26 ENCOUNTER — OFFICE VISIT (OUTPATIENT)
Dept: MEDICAL GROUP | Facility: MEDICAL CENTER | Age: 36
End: 2021-04-26
Payer: COMMERCIAL

## 2021-04-26 VITALS
TEMPERATURE: 99 F | OXYGEN SATURATION: 97 % | SYSTOLIC BLOOD PRESSURE: 110 MMHG | BODY MASS INDEX: 35.94 KG/M2 | WEIGHT: 229 LBS | HEART RATE: 84 BPM | HEIGHT: 67 IN | DIASTOLIC BLOOD PRESSURE: 70 MMHG | RESPIRATION RATE: 12 BRPM

## 2021-04-26 DIAGNOSIS — J30.9 ALLERGIC RHINITIS, UNSPECIFIED SEASONALITY, UNSPECIFIED TRIGGER: ICD-10-CM

## 2021-04-26 DIAGNOSIS — R11.0 NAUSEA: ICD-10-CM

## 2021-04-26 DIAGNOSIS — Z23 NEED FOR VACCINATION: ICD-10-CM

## 2021-04-26 DIAGNOSIS — Z78.9 VEGETARIAN DIET: ICD-10-CM

## 2021-04-26 DIAGNOSIS — Z00.00 WELL WOMAN EXAM (NO GYNECOLOGICAL EXAM): ICD-10-CM

## 2021-04-26 DIAGNOSIS — Z78.9 VARICELLA VACCINATION STATUS UNKNOWN: ICD-10-CM

## 2021-04-26 PROCEDURE — 90471 IMMUNIZATION ADMIN: CPT | Performed by: FAMILY MEDICINE

## 2021-04-26 PROCEDURE — 99395 PREV VISIT EST AGE 18-39: CPT | Mod: 25 | Performed by: FAMILY MEDICINE

## 2021-04-26 PROCEDURE — 90732 PPSV23 VACC 2 YRS+ SUBQ/IM: CPT | Performed by: FAMILY MEDICINE

## 2021-04-26 RX ORDER — MONTELUKAST SODIUM 10 MG/1
10 TABLET ORAL
COMMUNITY
Start: 2021-03-30

## 2021-04-26 RX ORDER — ONDANSETRON 4 MG/1
4 TABLET, FILM COATED ORAL EVERY 4 HOURS PRN
Qty: 30 TABLET | Refills: 0 | Status: SHIPPED | OUTPATIENT
Start: 2021-04-26 | End: 2021-05-26

## 2021-04-26 ASSESSMENT — PATIENT HEALTH QUESTIONNAIRE - PHQ9: CLINICAL INTERPRETATION OF PHQ2 SCORE: 0

## 2021-04-26 ASSESSMENT — FIBROSIS 4 INDEX: FIB4 SCORE: 0.45

## 2021-04-26 NOTE — ASSESSMENT & PLAN NOTE
Chronic issue that occurs about every 3 months without clear trigger.  Has associated diarrhea, stomach upset.  No cramping, blood in stools or odd looking stools.  She takes pepto or tums and hydrates and symptoms resolve.

## 2021-04-26 NOTE — PROGRESS NOTES
Pt has chronic allergic rhinitis and has been seeing Dr. Acosta at [facility] for immunotherapy and needs updated referral.  Referral placed.

## 2021-07-02 ENCOUNTER — HOSPITAL ENCOUNTER (OUTPATIENT)
Dept: LAB | Facility: MEDICAL CENTER | Age: 36
End: 2021-07-02
Attending: INTERNAL MEDICINE
Payer: COMMERCIAL

## 2021-07-02 ENCOUNTER — HOSPITAL ENCOUNTER (OUTPATIENT)
Dept: LAB | Facility: MEDICAL CENTER | Age: 36
End: 2021-07-02
Attending: FAMILY MEDICINE
Payer: COMMERCIAL

## 2021-07-02 DIAGNOSIS — Z00.00 WELL WOMAN EXAM (NO GYNECOLOGICAL EXAM): ICD-10-CM

## 2021-07-02 DIAGNOSIS — Z78.9 VEGETARIAN DIET: ICD-10-CM

## 2021-07-02 DIAGNOSIS — Z78.9 VARICELLA VACCINATION STATUS UNKNOWN: ICD-10-CM

## 2021-07-02 LAB
ALBUMIN SERPL BCP-MCNC: 4.4 G/DL (ref 3.2–4.9)
ALBUMIN/GLOB SERPL: 1.3 G/DL
ALP SERPL-CCNC: 56 U/L (ref 30–99)
ALT SERPL-CCNC: 20 U/L (ref 2–50)
ANION GAP SERPL CALC-SCNC: 9 MMOL/L (ref 7–16)
AST SERPL-CCNC: 20 U/L (ref 12–45)
BASOPHILS # BLD AUTO: 0.8 % (ref 0–1.8)
BASOPHILS # BLD: 0.05 K/UL (ref 0–0.12)
BILIRUB SERPL-MCNC: 0.5 MG/DL (ref 0.1–1.5)
BUN SERPL-MCNC: 8 MG/DL (ref 8–22)
CALCIUM SERPL-MCNC: 9.1 MG/DL (ref 8.5–10.5)
CHLORIDE SERPL-SCNC: 106 MMOL/L (ref 96–112)
CHOLEST SERPL-MCNC: 152 MG/DL (ref 100–199)
CO2 SERPL-SCNC: 25 MMOL/L (ref 20–33)
CREAT SERPL-MCNC: 0.92 MG/DL (ref 0.5–1.4)
CRP SERPL HS-MCNC: <0.3 MG/DL (ref 0–0.75)
EOSINOPHIL # BLD AUTO: 0.13 K/UL (ref 0–0.51)
EOSINOPHIL NFR BLD: 2 % (ref 0–6.9)
ERYTHROCYTE [DISTWIDTH] IN BLOOD BY AUTOMATED COUNT: 44.8 FL (ref 35.9–50)
ERYTHROCYTE [SEDIMENTATION RATE] IN BLOOD BY WESTERGREN METHOD: 7 MM/HOUR (ref 0–25)
GLOBULIN SER CALC-MCNC: 3.3 G/DL (ref 1.9–3.5)
GLUCOSE SERPL-MCNC: 78 MG/DL (ref 65–99)
HCT VFR BLD AUTO: 42.9 % (ref 37–47)
HDLC SERPL-MCNC: 45 MG/DL
HGB BLD-MCNC: 13.4 G/DL (ref 12–16)
IMM GRANULOCYTES # BLD AUTO: 0.01 K/UL (ref 0–0.11)
IMM GRANULOCYTES NFR BLD AUTO: 0.2 % (ref 0–0.9)
LDLC SERPL CALC-MCNC: 97 MG/DL
LYMPHOCYTES # BLD AUTO: 2.59 K/UL (ref 1–4.8)
LYMPHOCYTES NFR BLD: 40 % (ref 22–41)
MCH RBC QN AUTO: 27 PG (ref 27–33)
MCHC RBC AUTO-ENTMCNC: 31.2 G/DL (ref 33.6–35)
MCV RBC AUTO: 86.3 FL (ref 81.4–97.8)
MONOCYTES # BLD AUTO: 0.57 K/UL (ref 0–0.85)
MONOCYTES NFR BLD AUTO: 8.8 % (ref 0–13.4)
NEUTROPHILS # BLD AUTO: 3.13 K/UL (ref 2–7.15)
NEUTROPHILS NFR BLD: 48.2 % (ref 44–72)
NRBC # BLD AUTO: 0 K/UL
NRBC BLD-RTO: 0 /100 WBC
PLATELET # BLD AUTO: 336 K/UL (ref 164–446)
PMV BLD AUTO: 8.7 FL (ref 9–12.9)
POTASSIUM SERPL-SCNC: 4.6 MMOL/L (ref 3.6–5.5)
PROT SERPL-MCNC: 7.7 G/DL (ref 6–8.2)
RBC # BLD AUTO: 4.97 M/UL (ref 4.2–5.4)
SODIUM SERPL-SCNC: 140 MMOL/L (ref 135–145)
T4 FREE SERPL-MCNC: 1.43 NG/DL (ref 0.93–1.7)
TRIGL SERPL-MCNC: 50 MG/DL (ref 0–149)
TSH SERPL DL<=0.005 MIU/L-ACNC: 0.85 UIU/ML (ref 0.38–5.33)
VIT B12 SERPL-MCNC: 1361 PG/ML (ref 211–911)
VZV IGG SER IA-ACNC: 0.27
WBC # BLD AUTO: 6.5 K/UL (ref 4.8–10.8)

## 2021-07-02 PROCEDURE — 84443 ASSAY THYROID STIM HORMONE: CPT

## 2021-07-02 PROCEDURE — 83520 IMMUNOASSAY QUANT NOS NONAB: CPT

## 2021-07-02 PROCEDURE — 36415 COLL VENOUS BLD VENIPUNCTURE: CPT

## 2021-07-02 PROCEDURE — 86255 FLUORESCENT ANTIBODY SCREEN: CPT

## 2021-07-02 PROCEDURE — 82607 VITAMIN B-12: CPT

## 2021-07-02 PROCEDURE — 80061 LIPID PANEL: CPT

## 2021-07-02 PROCEDURE — 86038 ANTINUCLEAR ANTIBODIES: CPT

## 2021-07-02 PROCEDURE — 86376 MICROSOMAL ANTIBODY EACH: CPT

## 2021-07-02 PROCEDURE — 84439 ASSAY OF FREE THYROXINE: CPT

## 2021-07-02 PROCEDURE — 86800 THYROGLOBULIN ANTIBODY: CPT

## 2021-07-02 PROCEDURE — 86003 ALLG SPEC IGE CRUDE XTRC EA: CPT

## 2021-07-02 PROCEDURE — 88184 FLOWCYTOMETRY/ TC 1 MARKER: CPT

## 2021-07-02 PROCEDURE — 86787 VARICELLA-ZOSTER ANTIBODY: CPT

## 2021-07-02 PROCEDURE — 85652 RBC SED RATE AUTOMATED: CPT

## 2021-07-02 PROCEDURE — 82785 ASSAY OF IGE: CPT

## 2021-07-02 PROCEDURE — 85025 COMPLETE CBC W/AUTO DIFF WBC: CPT

## 2021-07-02 PROCEDURE — 83088 ASSAY OF HISTAMINE: CPT

## 2021-07-02 PROCEDURE — 86140 C-REACTIVE PROTEIN: CPT

## 2021-07-02 PROCEDURE — 80053 COMPREHEN METABOLIC PANEL: CPT

## 2021-07-04 LAB
ANCA IGG TITR SER IF: NORMAL {TITER}
NUCLEAR IGG SER QL IA: NORMAL

## 2021-07-05 LAB
HISTAMINE SERPL-SCNC: 21 NMOL/L (ref 0–8)
THYROGLOB AB SERPL-ACNC: <0.9 IU/ML (ref 0–4)
THYROPEROXIDASE AB SERPL-ACNC: 0.4 IU/ML (ref 0–9)

## 2021-07-06 LAB — TRYPTASE SERPL-MCNC: 2.5 UG/L

## 2021-07-07 LAB — SOYBEAN IGE QN: 2.6 KU/L

## 2021-07-08 LAB
DEPRECATED MISC ALLERGEN IGE RAST QL: NORMAL
IGE SERPL-ACNC: 712 KU/L

## 2021-07-16 LAB — URTIIND BASO ACT Q4770: 0 %

## 2021-08-28 ENCOUNTER — OFFICE VISIT (OUTPATIENT)
Dept: URGENT CARE | Facility: CLINIC | Age: 36
End: 2021-08-28
Payer: COMMERCIAL

## 2021-08-28 VITALS
BODY MASS INDEX: 36.47 KG/M2 | WEIGHT: 232.4 LBS | OXYGEN SATURATION: 97 % | HEIGHT: 67 IN | SYSTOLIC BLOOD PRESSURE: 120 MMHG | DIASTOLIC BLOOD PRESSURE: 70 MMHG | RESPIRATION RATE: 16 BRPM | HEART RATE: 91 BPM | TEMPERATURE: 97.6 F

## 2021-08-28 DIAGNOSIS — L08.9 SKIN INFECTION: ICD-10-CM

## 2021-08-28 PROCEDURE — 99214 OFFICE O/P EST MOD 30 MIN: CPT | Performed by: PHYSICIAN ASSISTANT

## 2021-08-28 RX ORDER — CEPHALEXIN 500 MG/1
500 CAPSULE ORAL 3 TIMES DAILY
Qty: 21 CAPSULE | Refills: 0 | Status: SHIPPED | OUTPATIENT
Start: 2021-08-28 | End: 2021-09-04

## 2021-08-28 ASSESSMENT — ENCOUNTER SYMPTOMS
VOMITING: 0
HEADACHES: 0
CONSTIPATION: 0
SHORTNESS OF BREATH: 0
MYALGIAS: 0
ABDOMINAL PAIN: 0
SORE THROAT: 0
EYE PAIN: 0
NAUSEA: 0
CHILLS: 0
DIARRHEA: 0
COUGH: 0
FEVER: 0

## 2021-08-28 ASSESSMENT — FIBROSIS 4 INDEX: FIB4 SCORE: 0.47

## 2021-08-28 NOTE — PROGRESS NOTES
Subjective:   Allison Collins is a 36 y.o. female who presents for Hand Pain ((L) pinky/ hand pain, x4 days)      HPI:  36-year-old female presents with atraumatic left hand pain at the distal end of the fifth metatarsal.  She does not recall any injury, trauma or skin break.  She does work as a  so is amply exposed to cat scratches and other minor injuries.  She denies any fevers or chills.  She notes worsening pain, redness, warmth.    Review of Systems   Constitutional: Negative for chills and fever.   HENT: Negative for congestion, ear pain and sore throat.    Eyes: Negative for pain.   Respiratory: Negative for cough and shortness of breath.    Cardiovascular: Negative for chest pain.   Gastrointestinal: Negative for abdominal pain, constipation, diarrhea, nausea and vomiting.   Genitourinary: Negative for dysuria.   Musculoskeletal: Negative for myalgias.   Skin: Negative for rash.   Neurological: Negative for headaches.       Medications:    • albuterol Aers  • cephALEXin Caps  • cetirizine Tabs  • diphenhydrAMINE Tabs  • EPINEPHrine Soaj  • fluticasone  • montelukast Tabs    Allergies: Latex    Problem List: Allison Collins does not have any pertinent problems on file.    Surgical History:  Past Surgical History:   Procedure Laterality Date   • DENTAL EXTRACTION(S)      wisdom teeth extraction       Past Social Hx: Allison Collins  reports that she has been smoking cigars. She has been smoking about 0.00 packs per day. She has never used smokeless tobacco. She reports current alcohol use. She reports previous drug use.     Past Family Hx:  Allison Collins family history includes Cancer in her maternal grandmother; Diabetes in her maternal grandmother; Hypertension in her maternal grandmother and mother.     Problem list, medications, and allergies reviewed by myself today in Epic.     Objective:     /70 (BP Location: Left arm, Patient Position: Sitting, BP Cuff Size:  "Adult long)   Pulse 91   Temp 36.4 °C (97.6 °F) (Temporal)   Resp 16   Ht 1.702 m (5' 7\")   Wt 105 kg (232 lb 6.4 oz)   SpO2 97%   BMI 36.40 kg/m²     Physical Exam  Vitals reviewed.   Constitutional:       Appearance: Normal appearance.   HENT:      Head: Normocephalic and atraumatic.      Right Ear: External ear normal.      Left Ear: External ear normal.      Nose: Nose normal.      Mouth/Throat:      Mouth: Mucous membranes are moist.   Eyes:      Conjunctiva/sclera: Conjunctivae normal.   Cardiovascular:      Rate and Rhythm: Normal rate.   Pulmonary:      Effort: Pulmonary effort is normal.   Skin:     General: Skin is warm and dry.      Capillary Refill: Capillary refill takes less than 2 seconds.      Comments: Distal end of the fifth metatarsal before the MTP left hand there is a focal area of warmth, erythema, swelling.  Mildly tender.  Flexion of the pinky is intact.  No streaking.  Pinky is unaffected.  Nontender over the bony prominences of the wrist.  Neurovascularly intact.   Neurological:      Mental Status: She is alert and oriented to person, place, and time.         Assessment/Plan:     Diagnosis and associated orders:     1. Skin infection  cephALEXin (KEFLEX) 500 MG Cap      Comments/MDM:     • Slightly puzzling as to the source of the infection, no obvious skin breaks or abrasions however given the patient's profession is very likely that she could have had an exposure at work that festered and only now developed symptoms in the preceding weeks.  We will try Keflex which should be good for gram-positive coverage, recommended returning to clinic tomorrow afternoon or evening if it seems like it is not improving for addition of atypical coverage especially given her profession.  She does a lot of dentistry as a .  Recommend warm soaks, Tylenol, if rapidly improving no need for follow-up.  No sign of tenosynovitis, negative Kanavel signs         Differential diagnosis, natural " history, supportive care, and indications for immediate follow-up discussed.    Advised the patient to follow-up with the primary care physician for recheck, reevaluation, and consideration of further management.    Please note that this dictation was created using voice recognition software. I have made a reasonable attempt to correct obvious errors, but I expect that there are errors of grammar and possibly content that I did not discover before finalizing the note.    This note was electronically signed by Pedro Romero PA-C

## 2021-08-29 ENCOUNTER — APPOINTMENT (OUTPATIENT)
Dept: RADIOLOGY | Facility: IMAGING CENTER | Age: 36
End: 2021-08-29
Attending: FAMILY MEDICINE
Payer: COMMERCIAL

## 2021-08-29 ENCOUNTER — OFFICE VISIT (OUTPATIENT)
Dept: URGENT CARE | Facility: CLINIC | Age: 36
End: 2021-08-29
Payer: COMMERCIAL

## 2021-08-29 VITALS
BODY MASS INDEX: 36.07 KG/M2 | TEMPERATURE: 97.6 F | RESPIRATION RATE: 16 BRPM | OXYGEN SATURATION: 98 % | HEIGHT: 67 IN | HEART RATE: 88 BPM | WEIGHT: 229.8 LBS | SYSTOLIC BLOOD PRESSURE: 110 MMHG | DIASTOLIC BLOOD PRESSURE: 70 MMHG

## 2021-08-29 DIAGNOSIS — M79.89 SWELLING OF LEFT HAND: ICD-10-CM

## 2021-08-29 DIAGNOSIS — L08.9 SOFT TISSUE INFECTION: ICD-10-CM

## 2021-08-29 PROCEDURE — 73130 X-RAY EXAM OF HAND: CPT | Mod: TC,LT | Performed by: FAMILY MEDICINE

## 2021-08-29 PROCEDURE — 99214 OFFICE O/P EST MOD 30 MIN: CPT | Performed by: FAMILY MEDICINE

## 2021-08-29 RX ORDER — SULFAMETHOXAZOLE AND TRIMETHOPRIM 800; 160 MG/1; MG/1
1 TABLET ORAL EVERY 12 HOURS
Qty: 10 TABLET | Refills: 0 | Status: SHIPPED | OUTPATIENT
Start: 2021-08-29 | End: 2021-09-03

## 2021-08-29 RX ORDER — PREDNISONE 20 MG/1
40 TABLET ORAL DAILY
Qty: 10 TABLET | Refills: 0 | Status: SHIPPED | OUTPATIENT
Start: 2021-08-29 | End: 2021-09-03

## 2021-08-29 ASSESSMENT — FIBROSIS 4 INDEX: FIB4 SCORE: 0.47

## 2021-09-05 ASSESSMENT — ENCOUNTER SYMPTOMS
SENSORY CHANGE: 0
FOCAL WEAKNESS: 0
WEIGHT LOSS: 0

## 2021-09-05 NOTE — PROGRESS NOTES
"Subjective     Allison Collins is a 36 y.o. female who presents with Hand Pain ((L) hand swollen follow up, 3 doses of antibiotics, the hand is more swollen today than yesterday so she wanted to check in. )            5 days left hand swelling redness and warmth.  Suspected infection as not improved yet on Keflex.  No fever.  No trauma.  No known inflammatory arthritis/gout.  No drainage.  No other aggravating alleviating factors.      Review of Systems   Constitutional: Negative for malaise/fatigue and weight loss.   Skin: Negative for itching and rash.   Neurological: Negative for sensory change and focal weakness.          FH gout: Grandfather.    Objective     /70 (BP Location: Left arm, Patient Position: Sitting, BP Cuff Size: Adult)   Pulse 88   Temp 36.4 °C (97.6 °F) (Temporal)   Resp 16   Ht 1.702 m (5' 7\")   Wt 104 kg (229 lb 12.8 oz)   SpO2 98%   BMI 35.99 kg/m²      Physical Exam  Constitutional:       Appearance: Normal appearance.   Musculoskeletal:        Hands:    Skin:     General: Skin is warm and dry.      Findings: No rash.   Neurological:      Mental Status: She is alert.                             Assessment & Plan       Xray: no fracture or dislocation per radiology    1. Soft tissue infection  sulfamethoxazole-trimethoprim (BACTRIM DS) 800-160 MG tablet   2. Swelling of left hand  predniSONE (DELTASONE) 20 MG Tab    DX-HAND 3+ LEFT     Differential diagnosis, natural history, supportive care, and indications for immediate follow-up discussed at length.     DDx includes inflammatory arthritis.  Will add Bactrim for MRSA coverage with trial of short course corticosteroid.           "

## 2021-09-26 NOTE — PROGRESS NOTES
Subjective:     CC: depression and anxiety    HPI:   Allison presents today with     Generalized anxiety disorder  Chronic issue with 2 panic attacks over the last 6 months which tend to be triggered by work stress.  She also feels anxious regarding financials, new baby responsibilities (baby is healthy but concern about health concerns in the future).    Major depressive disorder  Chronic issue for years with worsening over the last 6 months.  She has been under more stress at work and has a new baby girl at home (born 2/2021) who has changed her marital and home responsibilities.  Allison has been seeing a therapist and is doing home lifestyle, dietary changes without improvement.  Her previous coping mechanisms are not helping as well as previously.  She is not able to exercise as regularly as previously due to childcare responsibilities.  No active SI/Hi.  She has a gun at home but her wife has the keys to the case.    Nausea  Chronic, intermittent issue. Symptoms are occasionally worse when stressed and with certain foods, but she is not sure which foods exactly trigger her symptoms.  No cramping, blood in stools or abnormal appearing stools.  Symptoms resolve well with rare zofran use.  She is trying to eat more vegetables lately and this has triggered her symptoms slightly more.      Past Medical History:   Diagnosis Date   • Allergic rhinitis 5/13/2020       Social History     Tobacco Use   • Smoking status: Light Tobacco Smoker     Packs/day: 0.00     Types: Cigars   • Smokeless tobacco: Never Used   Vaping Use   • Vaping Use: Never used   Substance Use Topics   • Alcohol use: Yes   • Drug use: Not Currently       Current Outpatient Medications Ordered in Epic   Medication Sig Dispense Refill   • PARoxetine (PAXIL) 10 MG Tab Take 1 Tablet by mouth every day. 30 Tablet 1   • famotidine (PEPCID AC) 10 MG tablet Take 1 Tablet by mouth 2 times a day. 60 Tablet 1   • ondansetron (ZOFRAN ODT) 4 MG TABLET  "DISPERSIBLE Take 1 Tablet by mouth every 6 hours as needed for Nausea. 10 Tablet 0   • montelukast (SINGULAIR) 10 MG Tab Take 10 mg by mouth.     • EPINEPHrine (EPIPEN) 0.3 MG/0.3ML Solution Auto-injector solution for injection 0.3 mg by Intramuscular route Once PRN.     • cetirizine (ZYRTEC) 10 MG Tab Take 10 mg by mouth every day.     • diphenhydrAMINE (BENADRYL) 25 MG Tab Take 25 mg by mouth every 6 hours as needed for Sleep.       No current Epic-ordered facility-administered medications on file.       Allergies:  Latex    Health Maintenance: pt to schedule pap with gynecology, pt desire flu shot next visit    ROS:  Gen: no fevers/chills  Pulm: no sob, no cough  CV: no chest pain, no palpitations  GI:  no diarrhea or constipation      Objective:       Exam:  /72 (BP Location: Left arm, Patient Position: Sitting, BP Cuff Size: Adult long)   Pulse 94   Temp 36.9 °C (98.5 °F) (Temporal)   Resp 18   Ht 1.702 m (5' 7\")   Wt 104 kg (228 lb 3.2 oz)   LMP 09/21/2021   SpO2 95%   BMI 35.74 kg/m²  Body mass index is 35.74 kg/m².    Constitutional: Alert, no distress, well-groomed.  Skin: Warm, dry, good turgor, no rashes in visible areas.  Eye: Equal, round and reactive, conjunctiva clear, lids normal.  ENMT: Lips without lesions, good dentition, moist mucous membranes.  Neck: Trachea midline, no masses, no thyromegaly.  Respiratory: Unlabored respiratory effort, no cough.  MSK: Normal gait, moves all extremities.  Abd: Soft, nontender, nondistended.  Neuro: Grossly non-focal.   Psych: Alert and oriented x3, normal affect and mood.  Very pleasant.  Well groomed.  Linear thought process, good insight. PHQ9 = 20.  GAD7 = 18      Assessment & Plan:     36 y.o. female with the following -     1. Current severe episode of major depressive disorder without psychotic features without prior episode (HCC)  Chronic issue, worsening over the last 6 months.  No active SI/HI.  Continue counseling.  Continue lifestyle and " dietary modifications.  Encouraged regular exercise.  No family hx of bipolar.  No hx of suicide attempts.  No active SI/HI.  Verbally contracted for safety.  Start paxil (SE profile discussed).  Close follow-up in 1 month or sooner if symptoms worsen. Patient expressed understanding and agreement with plan.  - Patient has been identified as having a positive depression screening. Appropriate orders and counseling have been given.  - PARoxetine (PAXIL) 10 MG Tab; Take 1 Tablet by mouth every day.  Dispense: 30 Tablet; Refill: 1    2. Generalized anxiety disorder  Chronic issue, worsening over the last 6 months.  No active SI/HI.  Continue counseling.  Continue lifestyle and dietary modifications.  Encouraged regular exercise.  No family hx of bipolar.  No hx of suicide attempts.  No active SI/HI.  Verbally contracted for safety.  Start paxil (SE profile discussed).  Close follow-up in 1 month or sooner if symptoms worsen. Patient expressed understanding and agreement with plan.  - Patient has been identified as having a positive depression screening. Appropriate orders and counseling have been given.  - PARoxetine (PAXIL) 10 MG Tab; Take 1 Tablet by mouth every day.  Dispense: 30 Tablet; Refill: 1    3. Nausea  Chronic issue likely secondary to stress and GERD.  Will treat her anxiety and depression as per above.  Continue pepcid with rare zofran use (SE profile discussed).  Continue follow-up with allergist to help determine food triggers.  Will monitor  - famotidine (PEPCID AC) 10 MG tablet; Take 1 Tablet by mouth 2 times a day.  Dispense: 60 Tablet; Refill: 1  - ondansetron (ZOFRAN ODT) 4 MG TABLET DISPERSIBLE; Take 1 Tablet by mouth every 6 hours as needed for Nausea.  Dispense: 10 Tablet; Refill: 0      Return in about 4 weeks (around 10/25/2021) for Medication review.    Please note that this dictation was created using voice recognition software. I have made every reasonable attempt to correct obvious errors,  but I expect that there are errors of grammar and possibly content that I did not discover before finalizing the note.

## 2021-09-27 ENCOUNTER — OFFICE VISIT (OUTPATIENT)
Dept: MEDICAL GROUP | Facility: MEDICAL CENTER | Age: 36
End: 2021-09-27
Payer: COMMERCIAL

## 2021-09-27 VITALS
RESPIRATION RATE: 18 BRPM | HEART RATE: 94 BPM | SYSTOLIC BLOOD PRESSURE: 112 MMHG | WEIGHT: 228.2 LBS | BODY MASS INDEX: 35.82 KG/M2 | DIASTOLIC BLOOD PRESSURE: 72 MMHG | OXYGEN SATURATION: 95 % | TEMPERATURE: 98.5 F | HEIGHT: 67 IN

## 2021-09-27 DIAGNOSIS — F41.1 GENERALIZED ANXIETY DISORDER: ICD-10-CM

## 2021-09-27 DIAGNOSIS — R11.0 NAUSEA: ICD-10-CM

## 2021-09-27 DIAGNOSIS — F32.2 CURRENT SEVERE EPISODE OF MAJOR DEPRESSIVE DISORDER WITHOUT PSYCHOTIC FEATURES WITHOUT PRIOR EPISODE (HCC): ICD-10-CM

## 2021-09-27 PROBLEM — F32.9 MAJOR DEPRESSIVE DISORDER: Status: ACTIVE | Noted: 2021-09-27

## 2021-09-27 PROCEDURE — 99214 OFFICE O/P EST MOD 30 MIN: CPT | Performed by: FAMILY MEDICINE

## 2021-09-27 RX ORDER — FAMOTIDINE 10 MG
10 TABLET ORAL 2 TIMES DAILY
Qty: 60 TABLET | Refills: 1 | Status: SHIPPED | OUTPATIENT
Start: 2021-09-27 | End: 2023-02-15 | Stop reason: SDUPTHER

## 2021-09-27 RX ORDER — PAROXETINE 10 MG/1
10 TABLET, FILM COATED ORAL DAILY
Qty: 30 TABLET | Refills: 1 | Status: SHIPPED | OUTPATIENT
Start: 2021-09-27 | End: 2021-11-01 | Stop reason: SDUPTHER

## 2021-09-27 RX ORDER — ONDANSETRON 4 MG/1
4 TABLET, ORALLY DISINTEGRATING ORAL EVERY 6 HOURS PRN
Qty: 10 TABLET | Refills: 0 | Status: SHIPPED | OUTPATIENT
Start: 2021-09-27 | End: 2023-02-15 | Stop reason: SDUPTHER

## 2021-09-27 RX ORDER — FAMOTIDINE 10 MG
10 TABLET ORAL 2 TIMES DAILY
COMMUNITY
End: 2021-09-27 | Stop reason: SDUPTHER

## 2021-09-27 ASSESSMENT — PATIENT HEALTH QUESTIONNAIRE - PHQ9
SUM OF ALL RESPONSES TO PHQ QUESTIONS 1-9: 20
5. POOR APPETITE OR OVEREATING: 3 - NEARLY EVERY DAY
CLINICAL INTERPRETATION OF PHQ2 SCORE: 4

## 2021-09-27 ASSESSMENT — ANXIETY QUESTIONNAIRES
5. BEING SO RESTLESS THAT IT IS HARD TO SIT STILL: MORE THAN HALF THE DAYS
4. TROUBLE RELAXING: NEARLY EVERY DAY
2. NOT BEING ABLE TO STOP OR CONTROL WORRYING: NEARLY EVERY DAY
3. WORRYING TOO MUCH ABOUT DIFFERENT THINGS: NEARLY EVERY DAY
7. FEELING AFRAID AS IF SOMETHING AWFUL MIGHT HAPPEN: NEARLY EVERY DAY
6. BECOMING EASILY ANNOYED OR IRRITABLE: SEVERAL DAYS
GAD7 TOTAL SCORE: 18
1. FEELING NERVOUS, ANXIOUS, OR ON EDGE: NEARLY EVERY DAY

## 2021-09-27 ASSESSMENT — FIBROSIS 4 INDEX: FIB4 SCORE: 0.47

## 2021-09-27 NOTE — ASSESSMENT & PLAN NOTE
Chronic, intermittent issue. Symptoms are occasionally worse when stressed and with certain foods, but she is not sure which foods exactly trigger her symptoms.  No cramping, blood in stools or abnormal appearing stools.  Symptoms resolve well with rare zofran use.  She is trying to eat more vegetables lately and this has triggered her symptoms slightly more.

## 2021-09-27 NOTE — ASSESSMENT & PLAN NOTE
Chronic issue for years with worsening over the last 6 months.  She has been under more stress at work and has a new baby girl at home (born 2/2021) who has changed her marital and home responsibilities.  Allison has been seeing a therapist and is doing home lifestyle, dietary changes without improvement.  Her previous coping mechanisms are not helping as well as previously.  She is not able to exercise as regularly as previously due to childcare responsibilities.  No active SI/Hi.  She has a gun at home but her wife has the keys to the case.

## 2021-09-27 NOTE — ASSESSMENT & PLAN NOTE
Chronic issue with 2 panic attacks over the last 6 months which tend to be triggered by work stress.  She also feels anxious regarding financials, new baby responsibilities (baby is healthy but concern about health concerns in the future).

## 2021-11-01 ENCOUNTER — OFFICE VISIT (OUTPATIENT)
Dept: MEDICAL GROUP | Facility: MEDICAL CENTER | Age: 36
End: 2021-11-01
Payer: COMMERCIAL

## 2021-11-01 VITALS
BODY MASS INDEX: 35.79 KG/M2 | OXYGEN SATURATION: 97 % | WEIGHT: 228 LBS | RESPIRATION RATE: 12 BRPM | HEART RATE: 86 BPM | TEMPERATURE: 98.7 F | DIASTOLIC BLOOD PRESSURE: 62 MMHG | HEIGHT: 67 IN | SYSTOLIC BLOOD PRESSURE: 110 MMHG

## 2021-11-01 DIAGNOSIS — F32.2 CURRENT SEVERE EPISODE OF MAJOR DEPRESSIVE DISORDER WITHOUT PSYCHOTIC FEATURES WITHOUT PRIOR EPISODE (HCC): ICD-10-CM

## 2021-11-01 DIAGNOSIS — F41.1 GENERALIZED ANXIETY DISORDER: ICD-10-CM

## 2021-11-01 PROCEDURE — 99214 OFFICE O/P EST MOD 30 MIN: CPT | Performed by: FAMILY MEDICINE

## 2021-11-01 RX ORDER — PAROXETINE HYDROCHLORIDE 20 MG/1
20 TABLET, FILM COATED ORAL DAILY
Qty: 90 TABLET | Refills: 2 | Status: SHIPPED | OUTPATIENT
Start: 2021-11-01 | End: 2022-04-13 | Stop reason: SDUPTHER

## 2021-11-01 ASSESSMENT — ANXIETY QUESTIONNAIRES
6. BECOMING EASILY ANNOYED OR IRRITABLE: MORE THAN HALF THE DAYS
5. BEING SO RESTLESS THAT IT IS HARD TO SIT STILL: SEVERAL DAYS
GAD7 TOTAL SCORE: 11
3. WORRYING TOO MUCH ABOUT DIFFERENT THINGS: SEVERAL DAYS
1. FEELING NERVOUS, ANXIOUS, OR ON EDGE: MORE THAN HALF THE DAYS
4. TROUBLE RELAXING: MORE THAN HALF THE DAYS
7. FEELING AFRAID AS IF SOMETHING AWFUL MIGHT HAPPEN: MORE THAN HALF THE DAYS
2. NOT BEING ABLE TO STOP OR CONTROL WORRYING: SEVERAL DAYS

## 2021-11-01 ASSESSMENT — PATIENT HEALTH QUESTIONNAIRE - PHQ9
SUM OF ALL RESPONSES TO PHQ QUESTIONS 1-9: 13
5. POOR APPETITE OR OVEREATING: 2 - MORE THAN HALF THE DAYS
CLINICAL INTERPRETATION OF PHQ2 SCORE: 3

## 2021-11-01 ASSESSMENT — FIBROSIS 4 INDEX: FIB4 SCORE: 0.47

## 2021-11-01 NOTE — PROGRESS NOTES
Subjective:     CC: med review    HPI:   Allison presents today with     Depression- states she is feeling better after starting paxil and initially noticed the she slept a lot better and then had a burst of energy which has now leveled off.  She is still sleeping well but her energy level is not as a burst but improved compared to previous.  She feels that she is getting more quality rest.  She is still feeling depressed but is not worrying about as many things as previously.  She is able to focus on activities better since starting paroxetine.  She has not had a panic attack over the last month.  She is seeing her therapist weekly.  Her SI thoughts are decreasing and no HI.    Nausea- rare, intermittent issue that improves with eating a well balanced diet.  She was previously not eating until she got nauseous and finds this is helping.  She melo taking her famotidine daily.  She has only needed the zofran once.    She did a 10K yesterday but is not regularly exercising yet but is planning to start soon.      Past Medical History:   Diagnosis Date   • Allergic rhinitis 5/13/2020       Social History     Tobacco Use   • Smoking status: Light Tobacco Smoker     Packs/day: 0.00     Types: Cigars   • Smokeless tobacco: Never Used   Vaping Use   • Vaping Use: Never used   Substance Use Topics   • Alcohol use: Yes   • Drug use: Not Currently       Current Outpatient Medications Ordered in Epic   Medication Sig Dispense Refill   • PARoxetine (PAXIL) 20 MG Tab Take 1 Tablet by mouth every day. To replace paroxetine 10mg daily 90 Tablet 2   • famotidine (PEPCID AC) 10 MG tablet Take 1 Tablet by mouth 2 times a day. 60 Tablet 1   • ondansetron (ZOFRAN ODT) 4 MG TABLET DISPERSIBLE Take 1 Tablet by mouth every 6 hours as needed for Nausea. 10 Tablet 0   • montelukast (SINGULAIR) 10 MG Tab Take 10 mg by mouth.     • EPINEPHrine (EPIPEN) 0.3 MG/0.3ML Solution Auto-injector solution for injection 0.3 mg by Intramuscular route Once  "PRN.     • cetirizine (ZYRTEC) 10 MG Tab Take 10 mg by mouth every day.     • diphenhydrAMINE (BENADRYL) 25 MG Tab Take 25 mg by mouth every 6 hours as needed for Sleep.       No current Epic-ordered facility-administered medications on file.       Allergies:  Latex    Health Maintenance: pt defers flu shot but states she will return for MA visit. COVID19 booster recommendations given based on current CDC guidelines.    ROS:  Gen: no fevers/chills, no changes in weight  Eyes: no changes in vision  ENT: no sore throat, no hearing loss, no bloody nose  Pulm: no sob, no cough  CV: no chest pain, no palpitations  GI: no nausea/vomiting, no diarrhea  : no dysuria  MSk: no myalgias  Skin: no rash  Neuro: no headaches, no numbness/tingling  Heme/Lymph: no easy bruising      Objective:       Exam:  /62 (BP Location: Right arm, Patient Position: Sitting, BP Cuff Size: Adult)   Pulse 86   Temp 37.1 °C (98.7 °F) (Temporal)   Resp 12   Ht 1.702 m (5' 7\")   Wt 103 kg (228 lb)   SpO2 97%   BMI 35.71 kg/m²  Body mass index is 35.71 kg/m².    Gen: Alert and oriented, No apparent distress.  Neck: Neck is supple without lymphadenopathy.  Lungs: Normal effort, CTA bilaterally, no wheezes, rhonchi, or rales  CV: Regular rate and rhythm. No murmurs, rubs, or gallops.  Ext: No clubbing, cyanosis, edema.  Psych: Alert and oriented x3, normal affect and mood.  Very pleasant.  Well groomed.  Linear thought process, good insight. PHQ9 = 13.  GAD7 = 11      Assessment & Plan:     36 y.o. female with the following -     1. Current severe episode of major depressive disorder without psychotic features without prior episode (HCC)  Chronic, improving.  PHQ9 improved from 20 to 13.  No active SI/HI and SI thoughts are fleeting and less often after starting paxil 10mg daily.  Given persistent symptoms, though, will increase paxil 10 to 20mg daily.  Continue therapy.  Continue dietary and lifestyle modifications.  Follow-up and ER " precautions given.  - PARoxetine (PAXIL) 20 MG Tab; Take 1 Tablet by mouth every day. To replace paroxetine 10mg daily  Dispense: 90 Tablet; Refill: 2  - Patient has been identified as having a positive depression screening. Appropriate orders and counseling have been given.    2. Generalized anxiety disorder  Chronic, improving. GAD7 improved from 18 to 11.  Given that she is still having some anxiety symptoms, will increase paxil 10mg to 20mg daily.  SE profile discussed.  Continue dietary and lifestyle modifications and therapy.  Follow-up and ER precautions given. Patient expressed understanding and agreement with plan.  - PARoxetine (PAXIL) 20 MG Tab; Take 1 Tablet by mouth every day. To replace paroxetine 10mg daily  Dispense: 90 Tablet; Refill: 2      Return in about 4 weeks (around 11/29/2021) for Medication review.    Please note that this dictation was created using voice recognition software. I have made every reasonable attempt to correct obvious errors, but I expect that there are errors of grammar and possibly content that I did not discover before finalizing the note.

## 2021-11-30 NOTE — PROGRESS NOTES
"Virtual Visit: Established Patient   This visit was conducted via Zoom using secure and encrypted videoconferencing technology.   The patient was in a private location in the state of [place].    The patient's identity was confirmed and verbal consent was obtained for this virtual visit.    Subjective:   CC:   Chief Complaint   Patient presents with   • Follow-Up       Allison Collins is a 36 y.o. female presenting for evaluation and management of:    Depression- states her depression has greatly improved since last visit. She is sleeping and eating well.  Her energy level is also improved.  She has interest in her normal activities.  States she no longer feels \"in her head.\"  She is no longer feeling groggy and has no negative SEs to her medications.    Anxiety- states this is improving and she is worrying less than previously.  She is doing techniques taught her are working well.  She is able to focus on activities better since starting paroxetine.  She has not had a panic attack over the last month.  She is seeing her therapist weekly.  Her SI thoughts have stopped for the last 1-2 months and no HI.     Nausea- resolved with eating regular meals.      ROS   No chest pain, palpitations or SOB.    Current medicines (including changes today)  Current Outpatient Medications   Medication Sig Dispense Refill   • PARoxetine (PAXIL) 20 MG Tab Take 1 Tablet by mouth every day. To replace paroxetine 10mg daily 90 Tablet 2   • famotidine (PEPCID AC) 10 MG tablet Take 1 Tablet by mouth 2 times a day. 60 Tablet 1   • ondansetron (ZOFRAN ODT) 4 MG TABLET DISPERSIBLE Take 1 Tablet by mouth every 6 hours as needed for Nausea. 10 Tablet 0   • montelukast (SINGULAIR) 10 MG Tab Take 10 mg by mouth.     • EPINEPHrine (EPIPEN) 0.3 MG/0.3ML Solution Auto-injector solution for injection 0.3 mg by Intramuscular route Once PRN.     • cetirizine (ZYRTEC) 10 MG Tab Take 10 mg by mouth every day.     • diphenhydrAMINE (BENADRYL) 25 MG " "Tab Take 25 mg by mouth every 6 hours as needed for Sleep.       No current facility-administered medications for this visit.       Patient Active Problem List    Diagnosis Date Noted   • Major depressive disorder with single episode, in partial remission (HCC) 09/27/2021   • Generalized anxiety disorder 09/27/2021   • Vegetarian diet 04/26/2021   • Nausea 04/26/2021   • Chronic pain of both knees 09/28/2020   • Flat feet, bilateral 09/28/2020   • Obesity (BMI 30-39.9) 05/13/2020   • Allergic rhinitis 05/13/2020   • Sore armpit, left 05/13/2020        Objective:   Pulse 87 Comment: pt stated  Temp 36.8 °C (98.2 °F) (Temporal) Comment: pt stated  Ht 1.702 m (5' 7\") Comment: pt stated  BMI 35.71 kg/m²     Physical Exam:  Constitutional: Alert, no distress, well-groomed.  Skin: No rashes in visible areas.  Eye: Round. Conjunctiva clear, lids normal. No icterus.   ENMT: Lips pink without lesions, good dentition, moist mucous membranes. Phonation normal.  Neck: No masses, no thyromegaly. Moves freely without pain.  Respiratory: Unlabored respiratory effort, no cough or audible wheeze  Psych: Alert and oriented x3, normal affect and mood. Linear thought process, well groomed.  No SI/HI.  Very pleasant. PHQ2 = 0.  GAD7 = 6    Assessment and Plan:   The following treatment plan was discussed:     1. Major depressive disorder with single episode, in partial remission (HCC)  Chronic issue, improved compared to last visit with no more SI.  No negative Ses to paxil 20mg daily.  Will continue current medications, therapy, dietary and lifestyle modifications.  Follow-up precautions given for recurrent or worsening symptoms    2. Generalized anxiety disorder  Chronic, improving.  She is no longer having panic attacks and feels her current dose of paxil is working well to help her manage symptoms.  Continue therapy, paxil 20mg daily, dietary and lifestyle modifications.  Will monitor and follow-up precautions given. Patient " expressed understanding and agreement with plan.    Follow-up: Return in about 5 months (around 5/1/2022) for Medication review, MA visit asap for flu shot.

## 2021-12-01 ENCOUNTER — TELEMEDICINE (OUTPATIENT)
Dept: MEDICAL GROUP | Facility: MEDICAL CENTER | Age: 36
End: 2021-12-01
Payer: COMMERCIAL

## 2021-12-01 VITALS — TEMPERATURE: 98.2 F | BODY MASS INDEX: 35.71 KG/M2 | HEIGHT: 67 IN | HEART RATE: 87 BPM

## 2021-12-01 DIAGNOSIS — F32.4 MAJOR DEPRESSIVE DISORDER WITH SINGLE EPISODE, IN PARTIAL REMISSION (HCC): ICD-10-CM

## 2021-12-01 DIAGNOSIS — F41.1 GENERALIZED ANXIETY DISORDER: ICD-10-CM

## 2021-12-01 PROCEDURE — 99214 OFFICE O/P EST MOD 30 MIN: CPT | Mod: 95 | Performed by: FAMILY MEDICINE

## 2021-12-01 ASSESSMENT — PATIENT HEALTH QUESTIONNAIRE - PHQ9: CLINICAL INTERPRETATION OF PHQ2 SCORE: 0

## 2021-12-01 ASSESSMENT — ANXIETY QUESTIONNAIRES
GAD7 TOTAL SCORE: 6
4. TROUBLE RELAXING: SEVERAL DAYS
5. BEING SO RESTLESS THAT IT IS HARD TO SIT STILL: NOT AT ALL
7. FEELING AFRAID AS IF SOMETHING AWFUL MIGHT HAPPEN: SEVERAL DAYS
2. NOT BEING ABLE TO STOP OR CONTROL WORRYING: SEVERAL DAYS
6. BECOMING EASILY ANNOYED OR IRRITABLE: NOT AT ALL
1. FEELING NERVOUS, ANXIOUS, OR ON EDGE: MORE THAN HALF THE DAYS
3. WORRYING TOO MUCH ABOUT DIFFERENT THINGS: SEVERAL DAYS

## 2022-01-12 DIAGNOSIS — J30.9 ALLERGIC RHINITIS, UNSPECIFIED SEASONALITY, UNSPECIFIED TRIGGER: ICD-10-CM

## 2022-01-12 NOTE — PROGRESS NOTES
Received outside records from [facility] and pt will need new referral for continuity of care.  Referral placed. -Dr. Li

## 2022-01-21 ENCOUNTER — OFFICE VISIT (OUTPATIENT)
Dept: URGENT CARE | Facility: CLINIC | Age: 37
End: 2022-01-21
Payer: COMMERCIAL

## 2022-01-21 ENCOUNTER — APPOINTMENT (OUTPATIENT)
Dept: RADIOLOGY | Facility: IMAGING CENTER | Age: 37
End: 2022-01-21
Attending: PHYSICIAN ASSISTANT
Payer: COMMERCIAL

## 2022-01-21 VITALS
RESPIRATION RATE: 16 BRPM | SYSTOLIC BLOOD PRESSURE: 110 MMHG | OXYGEN SATURATION: 98 % | DIASTOLIC BLOOD PRESSURE: 72 MMHG | BODY MASS INDEX: 36.07 KG/M2 | HEART RATE: 80 BPM | HEIGHT: 67 IN | WEIGHT: 229.8 LBS | TEMPERATURE: 97.5 F

## 2022-01-21 DIAGNOSIS — M25.561 ACUTE PAIN OF RIGHT KNEE: ICD-10-CM

## 2022-01-21 PROCEDURE — 99214 OFFICE O/P EST MOD 30 MIN: CPT | Performed by: PHYSICIAN ASSISTANT

## 2022-01-21 PROCEDURE — 73564 X-RAY EXAM KNEE 4 OR MORE: CPT | Mod: TC,RT | Performed by: PHYSICIAN ASSISTANT

## 2022-01-21 ASSESSMENT — ENCOUNTER SYMPTOMS: FEVER: 0

## 2022-01-21 ASSESSMENT — FIBROSIS 4 INDEX: FIB4 SCORE: 0.47

## 2022-01-21 NOTE — PROGRESS NOTES
Subjective:   Allison Collins is a 36 y.o. female who presents today with   Chief Complaint   Patient presents with   • Knee Pain     Pt has (R) knee pain, swelling x 2 weeks        Knee Pain  This is a new problem. The current episode started 1 to 4 weeks ago. The problem occurs constantly. The problem has been unchanged. Pertinent negatives include no fever. The symptoms are aggravated by bending, walking and twisting. She has tried rest and ice (brace) for the symptoms. The treatment provided mild relief.     No recent injury or trauma.  Patient states that she is very active including playing tackle football.  She has had issues with her knee over the past couple years and has had x-rays in the past with her primary care.  Patient states x-ray came back with no abnormalities last year and was seen by physical therapy for a couple of months with some improvement.  Patient states she feels as if her knee is going to give out occasionally when she is walking upstairs.  PMH:  has a past medical history of Allergic rhinitis (5/13/2020). She also has no past medical history of Asthma.  MEDS:   Current Outpatient Medications:   •  PARoxetine (PAXIL) 20 MG Tab, Take 1 Tablet by mouth every day. To replace paroxetine 10mg daily, Disp: 90 Tablet, Rfl: 2  •  famotidine (PEPCID AC) 10 MG tablet, Take 1 Tablet by mouth 2 times a day., Disp: 60 Tablet, Rfl: 1  •  ondansetron (ZOFRAN ODT) 4 MG TABLET DISPERSIBLE, Take 1 Tablet by mouth every 6 hours as needed for Nausea., Disp: 10 Tablet, Rfl: 0  •  montelukast (SINGULAIR) 10 MG Tab, Take 10 mg by mouth., Disp: , Rfl:   •  EPINEPHrine (EPIPEN) 0.3 MG/0.3ML Solution Auto-injector solution for injection, 0.3 mg by Intramuscular route Once PRN., Disp: , Rfl:   •  cetirizine (ZYRTEC) 10 MG Tab, Take 10 mg by mouth every day., Disp: , Rfl:   •  diphenhydrAMINE (BENADRYL) 25 MG Tab, Take 25 mg by mouth every 6 hours as needed for Sleep., Disp: , Rfl:   ALLERGIES:   Allergies  "  Allergen Reactions   • Latex      Hives     SURGHX:   Past Surgical History:   Procedure Laterality Date   • DENTAL EXTRACTION(S)      wisdom teeth extraction     SOCHX:  reports that she has quit smoking. Her smoking use included cigars. She smoked 0.00 packs per day. She has never used smokeless tobacco. She reports current alcohol use. She reports previous drug use.  FH: Reviewed with patient, not pertinent to this visit.       Review of Systems   Constitutional: Negative for fever.   Musculoskeletal:        Right knee pain        Objective:   /72 (BP Location: Left arm, Patient Position: Sitting, BP Cuff Size: Large adult)   Pulse 80   Temp 36.4 °C (97.5 °F) (Temporal)   Resp 16   Ht 1.702 m (5' 7\")   Wt 104 kg (229 lb 12.8 oz)   SpO2 98%   BMI 35.99 kg/m²   Physical Exam  Vitals and nursing note reviewed.   Constitutional:       General: She is not in acute distress.     Appearance: Normal appearance. She is well-developed. She is not ill-appearing or toxic-appearing.   HENT:      Head: Normocephalic and atraumatic.      Right Ear: Hearing normal.      Left Ear: Hearing normal.   Cardiovascular:      Rate and Rhythm: Normal rate.   Pulmonary:      Effort: Pulmonary effort is normal.   Musculoskeletal:      Comments: Slightly decreased range of motion secondary to swelling with flexion and extension of the right knee.  Swelling appreciated to the medial and lateral aspect of the knee.  No significant bony tenderness to the patella of the right knee.  Full strength against resistance of the right knee.  Neurovascular intact distally.  No laxity with valgus or varus motion.  Negative anterior drawer test.   Skin:     General: Skin is warm and dry.   Neurological:      Mental Status: She is alert.      Coordination: Coordination normal.   Psychiatric:         Mood and Affect: Mood normal.       DX KNEE  FINDINGS:     MINERALIZATION: Mineralization is unremarkable for age.     INJURY: No acute " fracture or gross malalignment is seen.     MEDIAL JOINT COMPARTMENT: There is mild loss of joint space.  LATERAL JOINT COMPARTMENT: Unremarkable  PATELLOFEMORAL JOINT COMPARTMENT: Unremarkable  No joint effusion evident.           IMPRESSION:     No radiographic evidence of acute traumatic injury.    Assessment/Plan:   Assessment    1. Acute pain of right knee  - DX-KNEE COMPLETE 4+ RIGHT; Future  - Referral to Sports Medicine  Could be slight inflammation or early onset arthritic process. No signs of infection. Recommend use of Tylenol over-the-counter along with other rest, ice, compression and elevation she has been using.  If symptoms do not improve she will follow-up with sports medicine for further evaluation at that time.  Differential diagnosis, natural history, supportive care, and indications for immediate follow-up discussed.   Patient given instructions and understanding of medications and treatment.    If not improving in 3-5 days, F/U with PCP or return to  if symptoms worsen.    Patient agreeable to plan.      Please note that this dictation was created using voice recognition software. I have made every reasonable attempt to correct obvious errors, but I expect that there are errors of grammar and possibly content that I did not discover before finalizing the note.    Livan Vicente PA-C

## 2022-02-11 ENCOUNTER — OFFICE VISIT (OUTPATIENT)
Dept: SPORTS MEDICINE | Facility: CLINIC | Age: 37
End: 2022-02-11
Payer: COMMERCIAL

## 2022-02-11 VITALS
OXYGEN SATURATION: 96 % | WEIGHT: 229.8 LBS | DIASTOLIC BLOOD PRESSURE: 76 MMHG | HEART RATE: 85 BPM | HEIGHT: 67 IN | TEMPERATURE: 98.3 F | SYSTOLIC BLOOD PRESSURE: 122 MMHG | BODY MASS INDEX: 36.07 KG/M2 | RESPIRATION RATE: 16 BRPM

## 2022-02-11 DIAGNOSIS — M22.2X1 PATELLOFEMORAL SYNDROME, RIGHT: ICD-10-CM

## 2022-02-11 DIAGNOSIS — M25.461 EFFUSION OF RIGHT KNEE: ICD-10-CM

## 2022-02-11 DIAGNOSIS — M17.11 PRIMARY LOCALIZED OSTEOARTHRITIS OF RIGHT KNEE: ICD-10-CM

## 2022-02-11 PROCEDURE — 99214 OFFICE O/P EST MOD 30 MIN: CPT | Performed by: FAMILY MEDICINE

## 2022-02-11 ASSESSMENT — FIBROSIS 4 INDEX: FIB4 SCORE: 0.47

## 2022-02-11 NOTE — PROGRESS NOTES
CHIEF COMPLAINT:  Allison Collins female presenting at the request of Livan Vicente P.A.-C.  for evaluation of knee pain.     Allison Collins is complaining of right knee pain  present for 5 years, intermittently  Worse over the past month or so (since about mid January 2022)  Insidious without injury  Pain is at the anteromedial knee  Quality is sharp and constant with associated swelling  Pain is radiating to the region of the patellar tendon  She has also experience posterior knee pressure with deep knee flexion  Improved with resting, heat  Aggravated by stairs, cold weather and twisting/pivoting  previous knee issues without specific injury, and she has had similar symptoms in the LEFT knee for which she underwent formal physical therapy back in 2020 which did NOT help   Prior Treatments: Physical Therapy, [address]   Prior studies: X-Ray   Medications tried for pain include: acetaminophen  Mechanical Symptom history: No Locking and Clicking which is not necessarily painful  No night symptoms  Seen at urgent care, had x-rays, referred for further evaluation management      Weight lifting, 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 No pertinent past medical history.    PMH:  has a past medical history of Allergic rhinitis (5/13/2020). She also has no past medical history of Asthma.  MEDS:   Current Outpatient Medications:   •  PARoxetine (PAXIL) 20 MG Tab, Take 1 Tablet by mouth every day. To replace paroxetine 10mg daily, Disp: 90 Tablet, Rfl: 2  •  famotidine (PEPCID AC) 10 MG tablet, Take 1 Tablet by mouth 2 times a day., Disp: 60 Tablet, Rfl: 1  •  ondansetron (ZOFRAN ODT) 4 MG TABLET DISPERSIBLE, Take 1 Tablet by mouth every 6 hours as needed for Nausea., Disp: 10 Tablet, Rfl: 0  •  montelukast (SINGULAIR) 10 MG  "Tab, Take 10 mg by mouth., Disp: , Rfl:   •  EPINEPHrine (EPIPEN) 0.3 MG/0.3ML Solution Auto-injector solution for injection, 0.3 mg by Intramuscular route Once PRN., Disp: , Rfl:   •  cetirizine (ZYRTEC) 10 MG Tab, Take 10 mg by mouth every day., Disp: , Rfl:   •  diphenhydrAMINE (BENADRYL) 25 MG Tab, Take 25 mg by mouth every 6 hours as needed for Sleep., Disp: , Rfl:   ALLERGIES:   Allergies   Allergen Reactions   • Latex      Hives     SURGHX:   Past Surgical History:   Procedure Laterality Date   • DENTAL EXTRACTION(S)      wisdom teeth extraction     SOCHX:  reports that she has quit smoking. Her smoking use included cigars. She smoked 0.00 packs per day. She has never used smokeless tobacco. She reports current alcohol use. She reports previous drug use.  FH: Family history was reviewed, no pertinent findings to report     PHYSICAL EXAM:  /76 (BP Location: Left arm, Patient Position: Sitting, BP Cuff Size: Large adult)   Pulse 85   Temp 36.8 °C (98.3 °F) (Temporal)   Resp 16   Ht 1.702 m (5' 7\")   Wt 104 kg (229 lb 12.8 oz)   SpO2 96%   BMI 35.99 kg/m²      obese in no apparent distress, alert and oriented x 3.  Gait: normal     RIGHT Knee:  Slight Varus and Swelling /mild  Range of Motion Slightly limited with Flexion  3+ effusion  Patellar Medial facet tenderness, mild  Medial Joint Line Tenderness and NEGATIVE Leigha  Lateral Joint Line Non-tender and NEGATIVE Leigha  Trace Laxity with Varus stress  Trace Laxity with Valgus stress  Lachman's testing is Trace  Posterior Drawer Testing is Trace  The leg is otherwise neurovascularly intact    LEFT Knee:  Slight Varus and No Swelling   Range of Motion Intact  Trace effusion  Patellar No tenderness and no apprehension  Medial Joint Line Non-tender and NEGATIVE Leigha  Lateral Joint Line Non-tender and NEGATIVE Leigha  Trace Laxity with Varus stress  Trace Laxity with Valgus stress  Lachman's testing is Trace  Posterior Drawer Testing is " Trace  The leg is otherwise neurovascularly intact    Additional Findings: None    1. Effusion of right knee     2. Patellofemoral syndrome, right     3. Primary localized osteoarthritis of right knee       Discussed knee osteoarthritis management options includin.  Joint protection, SAMe and anti-inflammatories   2.  Non-offending activities such as cycling or swimming   3.  Knee bracing, Acupuncture  4. Injection options including corticosteroid, viscosupplementation and stem cell injections  5. 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  Particularly if her effusion gets worse or fails to resolve, we can discuss and have her reconsider aspiration with corticosteroid injection under ultrasound guidance            2022 9:01 AM     HISTORY/REASON FOR EXAM:  Atraumatic Pain/Swelling/Deformity.        TECHNIQUE/EXAM DESCRIPTION AND NUMBER OF VIEWS:  4 views of the  right knee.     COMPARISON: 10/12/2020     FINDINGS:     MINERALIZATION: Mineralization is unremarkable for age.     INJURY: No acute fracture or gross malalignment is seen.     MEDIAL JOINT COMPARTMENT: There is mild loss of joint space.  LATERAL JOINT COMPARTMENT: Unremarkable  PATELLOFEMORAL JOINT COMPARTMENT: Unremarkable  No joint effusion evident.           IMPRESSION:     No radiographic evidence of acute traumatic injury.             Exam Ended: 22  9:16 AM Last Resulted: 22  9:20 AM           done elsewhere and reviewed independently by me    Thank you Livan Vicente P.A.-C.  For allowing me to participate in caring  for your patient.    Greater than 45 minutes was spent reviewing patient history, discussing current issue, physical examination, reviewing results and documenting the visit.

## 2022-02-11 NOTE — Clinical Note
Mo Licona,  Thank you for referring Allison to our sports medicine clinic.  I think her increased activity (football) is likely exacerbating her knee pain/symptoms.  She may have a mildly symptomatic meniscal tear, but fortunately she has no mechanical symptoms at this point.  We made some recommendations, and she has trepidations about corticosteroid injections, but she will see how she does over the next few weeks and reschedule if symptoms persist.  Hope you are well!  L

## 2022-03-18 ENCOUNTER — OFFICE VISIT (OUTPATIENT)
Dept: SPORTS MEDICINE | Facility: CLINIC | Age: 37
End: 2022-03-18
Payer: COMMERCIAL

## 2022-03-18 VITALS — HEIGHT: 67 IN | WEIGHT: 229.8 LBS | BODY MASS INDEX: 36.07 KG/M2

## 2022-03-18 DIAGNOSIS — M25.461 EFFUSION OF RIGHT KNEE: ICD-10-CM

## 2022-03-18 DIAGNOSIS — M17.11 PRIMARY LOCALIZED OSTEOARTHRITIS OF RIGHT KNEE: ICD-10-CM

## 2022-03-18 DIAGNOSIS — M22.2X1 PATELLOFEMORAL SYNDROME, RIGHT: ICD-10-CM

## 2022-03-18 PROCEDURE — 20611 DRAIN/INJ JOINT/BURSA W/US: CPT | Mod: RT | Performed by: FAMILY MEDICINE

## 2022-03-18 RX ORDER — TRIAMCINOLONE ACETONIDE 40 MG/ML
40 INJECTION, SUSPENSION INTRA-ARTICULAR; INTRAMUSCULAR ONCE
Status: COMPLETED | OUTPATIENT
Start: 2022-03-18 | End: 2022-03-18

## 2022-03-18 RX ADMIN — TRIAMCINOLONE ACETONIDE 40 MG: 40 INJECTION, SUSPENSION INTRA-ARTICULAR; INTRAMUSCULAR at 09:25

## 2022-03-18 ASSESSMENT — FIBROSIS 4 INDEX: FIB4 SCORE: .4791574237499549349

## 2022-03-18 NOTE — PROGRESS NOTES
1. Effusion of right knee  triamcinolone acetonide (KENALOG-40) injection 40 mg   2. Patellofemoral syndrome, right  triamcinolone acetonide (KENALOG-40) injection 40 mg   3. Primary localized osteoarthritis of right knee  triamcinolone acetonide (KENALOG-40) injection 40 mg       PROCEDURE NOTE:  right knee ASPIRATION/WITH CORTICOSTEROID  injection  Consent was obtained, using sterile technique the knee was prepped  Supra-lateral patellar approach.   18 G needle for aspiration of 32 cc of straw colored fluid and the needle withdrawn.    Using the same port 5 cc 2% lidocaine and 40 mg kenalog injected into the knee joint  The procedure was well tolerated.    Watch for fever, or increased swelling or persistent pain in knee. Call or return to clinic prn if such symptoms occur or the knee fails to improve as anticipated.

## 2022-04-08 ENCOUNTER — OFFICE VISIT (OUTPATIENT)
Dept: SPORTS MEDICINE | Facility: CLINIC | Age: 37
End: 2022-04-08
Payer: COMMERCIAL

## 2022-04-08 VITALS
WEIGHT: 229.8 LBS | HEIGHT: 67 IN | HEART RATE: 72 BPM | SYSTOLIC BLOOD PRESSURE: 118 MMHG | OXYGEN SATURATION: 96 % | DIASTOLIC BLOOD PRESSURE: 80 MMHG | BODY MASS INDEX: 36.07 KG/M2 | TEMPERATURE: 98.1 F | RESPIRATION RATE: 18 BRPM

## 2022-04-08 DIAGNOSIS — M25.461 EFFUSION OF RIGHT KNEE: ICD-10-CM

## 2022-04-08 DIAGNOSIS — M22.2X1 PATELLOFEMORAL SYNDROME, RIGHT: ICD-10-CM

## 2022-04-08 DIAGNOSIS — M17.11 PRIMARY LOCALIZED OSTEOARTHRITIS OF RIGHT KNEE: ICD-10-CM

## 2022-04-08 PROCEDURE — 99213 OFFICE O/P EST LOW 20 MIN: CPT | Performed by: FAMILY MEDICINE

## 2022-04-08 ASSESSMENT — FIBROSIS 4 INDEX: FIB4 SCORE: .4791574237499549349

## 2022-04-08 NOTE — PROGRESS NOTES
"CHIEF COMPLAINT:  Allison Collins female presenting at the request of Livan Vicente P.A.-C.  for evaluation of knee pain.     Allison Collins is complaining of right knee pain  present for 5 years, intermittently  Worse over the past month or so (since about mid January 2022)  Insidious without injury  Pain is at the anteromedial knee  Quality is sharp and constant with associated swelling  Pain is radiating to the region of the patellar tendon  She has also experience posterior knee pressure with deep knee flexion  Improved with resting, heat  Aggravated by stairs, cold weather and twisting/pivoting  previous knee issues without specific injury, and she has had similar symptoms in the LEFT knee for which she underwent formal physical therapy back in 2020 which did NOT help     Overall she is 90% improved  Achy after and during football with pivoting  No locking, No swelling      Weight lifting, 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  Gait: normal     RIGHT Knee:  Slight Varus and NO swelling /mild  Range of Motion is INTACT  Trace effusion  NO patellar facet tenderness  NO Medial Joint Line Tenderness and NEGATIVE Leigha  Lateral Joint Line Non-tender and NEGATIVE Leigha  Trace Laxity with Varus stress  Trace Laxity with Valgus stress  Lachman's testing is Trace  Posterior Drawer Testing is Trace  The leg is otherwise neurovascularly intact    LEFT Knee:  Slight Varus and No Swelling   Range of Motion Intact    Additional Findings: None    1. Effusion of right knee     2. Patellofemoral syndrome, right     3. 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        TECHNIQUE/EXAM DESCRIPTION AND NUMBER OF VIEWS:  4 views of the  right knee.     COMPARISON: 10/12/2020     FINDINGS:     MINERALIZATION: Mineralization is unremarkable for age.     INJURY: No acute fracture or gross malalignment is seen.     MEDIAL JOINT COMPARTMENT: There is mild loss of joint space.  LATERAL JOINT COMPARTMENT: Unremarkable  PATELLOFEMORAL JOINT COMPARTMENT: Unremarkable  No joint effusion evident.           IMPRESSION:     No radiographic evidence of acute traumatic injury.             Exam Ended: 01/21/22  9:16 AM Last Resulted: 01/21/22  9:20 AM           Thank you Livan Vicente P.A.-C.  For allowing me to participate in caring for your patient.

## 2022-04-13 ENCOUNTER — PATIENT MESSAGE (OUTPATIENT)
Dept: MEDICAL GROUP | Facility: MEDICAL CENTER | Age: 37
End: 2022-04-13
Payer: COMMERCIAL

## 2022-04-13 DIAGNOSIS — F41.1 GENERALIZED ANXIETY DISORDER: ICD-10-CM

## 2022-04-13 DIAGNOSIS — F32.2 CURRENT SEVERE EPISODE OF MAJOR DEPRESSIVE DISORDER WITHOUT PSYCHOTIC FEATURES WITHOUT PRIOR EPISODE (HCC): ICD-10-CM

## 2022-04-13 RX ORDER — PAROXETINE 30 MG/1
30 TABLET, FILM COATED ORAL DAILY
Qty: 30 TABLET | Refills: 1 | Status: SHIPPED | OUTPATIENT
Start: 2022-04-13 | End: 2022-04-25 | Stop reason: SDUPTHER

## 2022-04-25 ENCOUNTER — TELEMEDICINE (OUTPATIENT)
Dept: MEDICAL GROUP | Facility: MEDICAL CENTER | Age: 37
End: 2022-04-25
Payer: COMMERCIAL

## 2022-04-25 VITALS — TEMPERATURE: 96 F | BODY MASS INDEX: 35.99 KG/M2 | HEIGHT: 67 IN | HEART RATE: 102 BPM

## 2022-04-25 DIAGNOSIS — F41.1 GENERALIZED ANXIETY DISORDER: ICD-10-CM

## 2022-04-25 DIAGNOSIS — Z88.9 HISTORY OF ALLERGIC REACTION: ICD-10-CM

## 2022-04-25 DIAGNOSIS — Z00.00 WELL WOMAN EXAM (NO GYNECOLOGICAL EXAM): ICD-10-CM

## 2022-04-25 DIAGNOSIS — F32.2 CURRENT SEVERE EPISODE OF MAJOR DEPRESSIVE DISORDER WITHOUT PSYCHOTIC FEATURES WITHOUT PRIOR EPISODE (HCC): ICD-10-CM

## 2022-04-25 PROCEDURE — 99214 OFFICE O/P EST MOD 30 MIN: CPT | Mod: 95 | Performed by: FAMILY MEDICINE

## 2022-04-25 RX ORDER — EPINEPHRINE 0.3 MG/.3ML
0.3 INJECTION SUBCUTANEOUS ONCE
Qty: 1 EACH | Refills: 0 | Status: SHIPPED | OUTPATIENT
Start: 2022-04-25 | End: 2022-04-25

## 2022-04-25 RX ORDER — PAROXETINE 30 MG/1
30 TABLET, FILM COATED ORAL DAILY
Qty: 90 TABLET | Refills: 1 | Status: SHIPPED | OUTPATIENT
Start: 2022-04-25 | End: 2023-01-03

## 2022-04-25 ASSESSMENT — ANXIETY QUESTIONNAIRES
2. NOT BEING ABLE TO STOP OR CONTROL WORRYING: SEVERAL DAYS
5. BEING SO RESTLESS THAT IT IS HARD TO SIT STILL: SEVERAL DAYS
1. FEELING NERVOUS, ANXIOUS, OR ON EDGE: MORE THAN HALF THE DAYS
3. WORRYING TOO MUCH ABOUT DIFFERENT THINGS: SEVERAL DAYS
7. FEELING AFRAID AS IF SOMETHING AWFUL MIGHT HAPPEN: MORE THAN HALF THE DAYS
6. BECOMING EASILY ANNOYED OR IRRITABLE: SEVERAL DAYS
GAD7 TOTAL SCORE: 10
4. TROUBLE RELAXING: MORE THAN HALF THE DAYS

## 2022-04-25 ASSESSMENT — PATIENT HEALTH QUESTIONNAIRE - PHQ9: CLINICAL INTERPRETATION OF PHQ2 SCORE: 0

## 2022-04-25 NOTE — PROGRESS NOTES
Virtual Visit: Established Patient   This visit was conducted via Zoom using secure and encrypted videoconferencing technology.   The patient was in a private location outside of their home in the state of [place].    The patient's identity was confirmed and verbal consent was obtained for this virtual visit.     Subjective:   CC:   Chief Complaint   Patient presents with   • Follow-Up       Allison Collins is a 37 y.o. female presenting for evaluation and management of:    Anxiety and depression- Since last visit, she has started paxil 30mg daily.  States her mood is doing much better.  She is seeing her therapist regularly and has noticed that the weather had an impact on her mood.  She had mild stomach upset when increasing the dose, but is eating more regularly and abdominal discomfort is improving.  No SI/HI.    ROS   No fevers, chills, abnormal weight changes.  No chest pain, palpitations, SOB.  No headaches or dizziness.  No weakness or numbness.  No abnormal breast lumps or bumps.    Current medicines (including changes today)  Current Outpatient Medications   Medication Sig Dispense Refill   • EPINEPHrine (EPIPEN) 0.3 MG/0.3ML Solution Auto-injector solution for injection Inject 0.3 mL into the shoulder, thigh, or buttocks one time for 1 dose. 1 Each 0   • PARoxetine (PAXIL) 30 MG Tab Take 1 Tablet by mouth every day. To replace paroxetine 20mg 90 Tablet 1   • famotidine (PEPCID AC) 10 MG tablet Take 1 Tablet by mouth 2 times a day. 60 Tablet 1   • ondansetron (ZOFRAN ODT) 4 MG TABLET DISPERSIBLE Take 1 Tablet by mouth every 6 hours as needed for Nausea. 10 Tablet 0   • montelukast (SINGULAIR) 10 MG Tab Take 10 mg by mouth.     • EPINEPHrine (EPIPEN) 0.3 MG/0.3ML Solution Auto-injector solution for injection 0.3 mg by Intramuscular route Once PRN.     • cetirizine (ZYRTEC) 10 MG Tab Take 10 mg by mouth every day.     • diphenhydrAMINE (BENADRYL) 25 MG Tab Take 25 mg by mouth every 6 hours as needed for  "Sleep.       No current facility-administered medications for this visit.       Patient Active Problem List    Diagnosis Date Noted   • Major depressive disorder with single episode, in partial remission (HCC) 09/27/2021   • Generalized anxiety disorder 09/27/2021   • Vegetarian diet 04/26/2021   • Nausea 04/26/2021   • Chronic pain of both knees 09/28/2020   • Flat feet, bilateral 09/28/2020   • Obesity (BMI 30-39.9) 05/13/2020   • Allergic rhinitis 05/13/2020   • Sore armpit, left 05/13/2020        Objective:   Pulse (!) 102   Temp (!) 35.6 °C (96 °F) (Temporal)   Ht 1.702 m (5' 7\")   BMI 35.99 kg/m²     Physical Exam:  Constitutional: Alert, no distress, well-groomed.  Skin: No rashes in visible areas.  Eye: Round. Conjunctiva clear, lids normal. No icterus.   ENMT: Lips pink without lesions, good dentition, moist mucous membranes. Phonation normal.  Neck: No masses, no thyromegaly. Moves freely without pain.  Respiratory: Unlabored respiratory effort, no cough or audible wheeze  Psych: Alert and oriented x3, normal affect and mood.  GAD7 = 10.  PHQ2 = 0.  No SI/HI    Assessment and Plan:   The following treatment plan was discussed:     1. Well woman exam (no gynecological exam)  Doing well. COVID19 booster recommendations given based on current CDC guidelines.  Pt plans to get pap smear with gynecology.  - CBC WITH DIFFERENTIAL; Future  - VITAMIN B12; Future  - Comp Metabolic Panel; Future    2. Generalized anxiety disorder  Chronic, worsened since last visit but has been improving with starting paxil 30mg daily.  Continue current medication, therapy.  Psychiatry referral was also given to her today.  Dietary and lifestyle modifications discussed.  Will monitor  - PARoxetine (PAXIL) 30 MG Tab; Take 1 Tablet by mouth every day. To replace paroxetine 20mg  Dispense: 90 Tablet; Refill: 1  - Referral to Psychiatry    3. History of allergic reaction  Pt has had hives with latex exposure.  No hx of anaphylaxis.  " Refill of epipen given.  Pt to continue to avoid known allergens.  Will monitor and follow-up precautions given. Patient expressed understanding and agreement with plan.  - EPINEPHrine (EPIPEN) 0.3 MG/0.3ML Solution Auto-injector solution for injection; Inject 0.3 mL into the shoulder, thigh, or buttocks one time for 1 dose.  Dispense: 1 Each; Refill: 0    4. Current severe episode of major depressive disorder without psychotic features without prior episode (HCC)  Chronic, worsened since last visit but has been improving with starting paxil 30mg daily.  Continue current medication, therapy.  Psychiatry referral was also given to her today.  Dietary and lifestyle modifications discussed.  Will monitor  - PARoxetine (PAXIL) 30 MG Tab; Take 1 Tablet by mouth every day. To replace paroxetine 20mg  Dispense: 90 Tablet; Refill: 1  - Referral to Psychiatry      Follow-up: Return in about 3 months (around 7/25/2022) for Medication review.

## 2022-05-27 ENCOUNTER — OFFICE VISIT (OUTPATIENT)
Dept: SPORTS MEDICINE | Facility: CLINIC | Age: 37
End: 2022-05-27
Payer: COMMERCIAL

## 2022-05-27 VITALS
DIASTOLIC BLOOD PRESSURE: 78 MMHG | RESPIRATION RATE: 16 BRPM | SYSTOLIC BLOOD PRESSURE: 118 MMHG | BODY MASS INDEX: 36.07 KG/M2 | TEMPERATURE: 98.2 F | HEART RATE: 80 BPM | HEIGHT: 67 IN | WEIGHT: 229.8 LBS | OXYGEN SATURATION: 100 %

## 2022-05-27 DIAGNOSIS — M25.461 EFFUSION OF RIGHT KNEE: ICD-10-CM

## 2022-05-27 DIAGNOSIS — M17.11 PRIMARY LOCALIZED OSTEOARTHRITIS OF RIGHT KNEE: ICD-10-CM

## 2022-05-27 DIAGNOSIS — S83.206A MCMURRAY TEST POSITIVE, RIGHT, INITIAL ENCOUNTER: ICD-10-CM

## 2022-05-27 DIAGNOSIS — M22.2X1 PATELLOFEMORAL SYNDROME, RIGHT: ICD-10-CM
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
## 2022-05-27 PROCEDURE — 99213 OFFICE O/P EST LOW 20 MIN: CPT | Mod: 25 | Performed by: FAMILY MEDICINE

## 2022-05-27 PROCEDURE — 20611 DRAIN/INJ JOINT/BURSA W/US: CPT | Mod: RT | Performed by: FAMILY MEDICINE

## 2022-05-27 ASSESSMENT — FIBROSIS 4 INDEX: FIB4 SCORE: .4791574237499549349

## 2022-05-27 NOTE — PROGRESS NOTES
"CHIEF COMPLAINT:  Allison Collins female presenting at the request of Livan Vicente P.A.-C.  for evaluation of knee pain.     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  Gait: normal     RIGHT Knee:  Slight Varus and NO swelling /mild  Range of Motion is INTACT  3+ effusion  NO patellar facet tenderness  POSITIVE medial joint Line Tenderness and POSITIVE Leigha  Lateral Joint Line Non-tender and NEGATIVE Leigha  Trace Laxity with Varus stress  Trace Laxity with Valgus stress  Lachman's testing is Trace  Posterior Drawer Testing is Trace  The leg is otherwise neurovascularly intact    Additional Findings: None    1. Effusion of right knee  MR-KNEE-W/O RIGHT   2. Patellofemoral syndrome, right  MR-KNEE-W/O RIGHT   3. Primary localized osteoarthritis of right knee  MR-KNEE-W/O RIGHT   4. Leigha test positive, right, initial encounter  MR-KNEE-W/O RIGHT   5. 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        TECHNIQUE/EXAM DESCRIPTION AND NUMBER OF VIEWS:  4 views of the  right knee.     COMPARISON: 10/12/2020     FINDINGS:     MINERALIZATION: Mineralization is unremarkable for age.     INJURY: No acute fracture or gross malalignment is seen.     MEDIAL JOINT COMPARTMENT: There is mild loss of joint space.  LATERAL JOINT COMPARTMENT: Unremarkable  PATELLOFEMORAL JOINT COMPARTMENT: Unremarkable  No joint effusion evident.           IMPRESSION:     No radiographic evidence of acute traumatic injury.             Exam Ended: 01/21/22  9:16 AM Last Resulted: 01/21/22  9:20 AM           Thank you Livan Vicente P.A.-C.  For allowing me to participate in caring for your patient.

## 2022-05-27 NOTE — PROCEDURES
PROCEDURE NOTE:  right Knee ASPIRATION ONLY injection with direct needle visualization under ultrasound guidance  Risks and benefits discussed  Informed consent obtained  Knee prepped in sterile fashion utilizing a roque-superior approach  18-gauge needle utilized to aspirate 36 cc of straw-colored fluid  Vapocoolant spray was utilized  Patient tolerated the procedure well  Postprocedure care and red flags discussed

## 2022-06-10 ENCOUNTER — HOSPITAL ENCOUNTER (OUTPATIENT)
Dept: RADIOLOGY | Facility: MEDICAL CENTER | Age: 37
End: 2022-06-10
Attending: FAMILY MEDICINE
Payer: COMMERCIAL

## 2022-06-10 DIAGNOSIS — M22.2X1 PATELLOFEMORAL SYNDROME, RIGHT: ICD-10-CM

## 2022-06-10 DIAGNOSIS — S83.206A MCMURRAY TEST POSITIVE, RIGHT, INITIAL ENCOUNTER: ICD-10-CM
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
## 2022-06-10 DIAGNOSIS — M17.11 PRIMARY LOCALIZED OSTEOARTHRITIS OF RIGHT KNEE: ICD-10-CM

## 2022-06-10 DIAGNOSIS — M25.461 EFFUSION OF RIGHT KNEE: ICD-10-CM

## 2022-06-10 PROCEDURE — 73721 MRI JNT OF LWR EXTRE W/O DYE: CPT | Mod: RT

## 2022-08-08 ENCOUNTER — OFFICE VISIT (OUTPATIENT)
Dept: URGENT CARE | Facility: CLINIC | Age: 37
End: 2022-08-08
Payer: COMMERCIAL

## 2022-08-08 VITALS
RESPIRATION RATE: 16 BRPM | OXYGEN SATURATION: 99 % | BODY MASS INDEX: 33.74 KG/M2 | DIASTOLIC BLOOD PRESSURE: 78 MMHG | WEIGHT: 215 LBS | TEMPERATURE: 98.3 F | HEIGHT: 67 IN | HEART RATE: 89 BPM | SYSTOLIC BLOOD PRESSURE: 126 MMHG

## 2022-08-08 DIAGNOSIS — R05.9 COUGH: ICD-10-CM

## 2022-08-08 DIAGNOSIS — T78.40XA ALLERGY, INITIAL ENCOUNTER: ICD-10-CM

## 2022-08-08 DIAGNOSIS — R51.9 SINUS HEADACHE: ICD-10-CM

## 2022-08-08 LAB
EXTERNAL QUALITY CONTROL: NORMAL
SARS-COV+SARS-COV-2 AG RESP QL IA.RAPID: NEGATIVE

## 2022-08-08 PROCEDURE — 87426 SARSCOV CORONAVIRUS AG IA: CPT | Performed by: FAMILY MEDICINE

## 2022-08-08 PROCEDURE — 99214 OFFICE O/P EST MOD 30 MIN: CPT | Performed by: FAMILY MEDICINE

## 2022-08-08 RX ORDER — METHYLPREDNISOLONE 4 MG/1
TABLET ORAL
Qty: 21 TABLET | Refills: 0 | Status: SHIPPED | OUTPATIENT
Start: 2022-08-08 | End: 2023-01-11

## 2022-08-08 ASSESSMENT — ENCOUNTER SYMPTOMS
VOMITING: 1
SORE THROAT: 1
COUGH: 1

## 2022-08-08 ASSESSMENT — FIBROSIS 4 INDEX: FIB4 SCORE: .4791574237499549349

## 2022-08-08 NOTE — PROGRESS NOTES
Subjective     Allison Collins is a 37 y.o. female who presents with Pharyngitis (X2days, sore throat, and sinus pressure headache/(x3-5days, runny nose, slight cough )/Pt believes it is consistent with allergies ) and Emesis (One episode of vomiting )      - This is a pleasant and nontoxic appearing 37 y.o. female who has come to the walk-in clinic today for:    #1) ~5 days w/ stuffy/runny nose, sinus headaches, sore throat and cough. vomited once. No fevers. Hx bad allergies and feels may be related to allergies acting up. Also wants to check make sure not covid.           ALLERGIES:  Latex     PMH:  Past Medical History:   Diagnosis Date   • Allergic rhinitis 5/13/2020        PSH:  Past Surgical History:   Procedure Laterality Date   • DENTAL EXTRACTION(S)      wisdom teeth extraction       MEDS:    Current Outpatient Medications:   •  methylPREDNISolone (MEDROL DOSEPAK) 4 MG Tablet Therapy Pack, Follow schedule on package instructions., Disp: 21 Tablet, Rfl: 0  •  PARoxetine (PAXIL) 30 MG Tab, Take 1 Tablet by mouth every day. To replace paroxetine 20mg, Disp: 90 Tablet, Rfl: 1  •  famotidine (PEPCID AC) 10 MG tablet, Take 1 Tablet by mouth 2 times a day., Disp: 60 Tablet, Rfl: 1  •  ondansetron (ZOFRAN ODT) 4 MG TABLET DISPERSIBLE, Take 1 Tablet by mouth every 6 hours as needed for Nausea., Disp: 10 Tablet, Rfl: 0  •  montelukast (SINGULAIR) 10 MG Tab, Take 10 mg by mouth., Disp: , Rfl:   •  EPINEPHrine (EPIPEN) 0.3 MG/0.3ML Solution Auto-injector solution for injection, 0.3 mg by Intramuscular route Once PRN., Disp: , Rfl:   •  cetirizine (ZYRTEC) 10 MG Tab, Take 10 mg by mouth every day., Disp: , Rfl:   •  diphenhydrAMINE (BENADRYL) 25 MG Tab, Take 25 mg by mouth every 6 hours as needed for Sleep., Disp: , Rfl:     ** I have documented what I find to be significant in regards to past medical, social, family and surgical history  in my HPI or under PMH/PSH/FH review section, otherwise it is  "noncontributory **           HPI    Review of Systems   HENT: Positive for congestion and sore throat.    Respiratory: Positive for cough.    Gastrointestinal: Positive for vomiting.   All other systems reviewed and are negative.             Objective     /78   Pulse 89   Temp 36.8 °C (98.3 °F) (Temporal)   Resp 16   Ht 1.702 m (5' 7\")   Wt 97.5 kg (215 lb)   SpO2 99%   Breastfeeding No   BMI 33.67 kg/m²      Physical Exam  Vitals and nursing note reviewed.   Constitutional:       General: She is not in acute distress.     Appearance: Normal appearance. She is well-developed.   HENT:      Head: Normocephalic.      Nose: Congestion present. No rhinorrhea.      Mouth/Throat:      Mouth: Mucous membranes are moist.      Pharynx: Oropharynx is clear.   Cardiovascular:      Heart sounds: Normal heart sounds. No murmur heard.  Pulmonary:      Effort: Pulmonary effort is normal. No respiratory distress.      Breath sounds: Normal breath sounds.   Neurological:      Mental Status: She is alert.      Motor: No abnormal muscle tone.   Psychiatric:         Mood and Affect: Mood normal.         Behavior: Behavior normal.                             Assessment & Plan       1. Sinus headache  methylPREDNISolone (MEDROL DOSEPAK) 4 MG Tablet Therapy Pack   2. Allergy, initial encounter  methylPREDNISolone (MEDROL DOSEPAK) 4 MG Tablet Therapy Pack   3. Cough  methylPREDNISolone (MEDROL DOSEPAK) 4 MG Tablet Therapy Pack     * * Hx chronic seasonal allergies w/ exacerbation. Trial of above. covid ruled out     - Dx, plan & d/c instructions discussed   - Rest, stay hydrated  - Continue Zyrtec, Singulair  - E.R. precautions discussed         Asked to kindly follow up with their PCP's office for a recheck on today's visit, ER if not improving in 2-3 days or if feeling/getting worse. If you do not have a primary care doctor and need to schedule one you may call [facility] at [phone] to do this.    Any realistic side effects " of medications that may have been given today reviewed.     Patient left in stable condition     POCT results reviewed/discussed

## 2022-12-31 DIAGNOSIS — F32.2 CURRENT SEVERE EPISODE OF MAJOR DEPRESSIVE DISORDER WITHOUT PSYCHOTIC FEATURES WITHOUT PRIOR EPISODE (HCC): ICD-10-CM

## 2022-12-31 DIAGNOSIS — F41.1 GENERALIZED ANXIETY DISORDER: ICD-10-CM

## 2023-01-03 RX ORDER — PAROXETINE 30 MG/1
TABLET, FILM COATED ORAL
Qty: 30 TABLET | Refills: 0 | Status: SHIPPED | OUTPATIENT
Start: 2023-01-03 | End: 2023-01-11

## 2023-01-10 NOTE — PROGRESS NOTES
Subjective:     CC: mood follow-up and referral for hysterectomy    HPI:   Allison presents today with her daughter to discuss:    Major depressive disorder with single episode, in partial remission (HCC)  Chronic issue for years. She feels that her mood has been improving well with her medication and has no negative SEs to her medication.  She finds that the holiday season causes worsening of mood, but this is not as bad as prior to taking medication.  She has not been going to therapy (has been working 2 jobs and saving money for home and for another child) and is doing home lifestyle, dietary changes.  No active SI/Hi.  She has noticed some increased difficulty sleeping recently and trouble staying asleep.  She reports good support at home.    Family planning  Her wife carried her previous child and patient does not desire pregnancy to be done by herself.  She feels this could risk worsening her mood.      Past Medical History:   Diagnosis Date    Allergic rhinitis 5/13/2020       Social History     Tobacco Use    Smoking status: Former     Packs/day: 0.00     Types: Cigars, Cigarettes    Smokeless tobacco: Never   Vaping Use    Vaping Use: Never used   Substance Use Topics    Alcohol use: Yes    Drug use: Not Currently       Current Outpatient Medications Ordered in Epic   Medication Sig Dispense Refill    paroxetine (PAXIL) 40 MG tablet Take 1 Tablet by mouth every day. 30 Tablet 5    famotidine (PEPCID AC) 10 MG tablet Take 1 Tablet by mouth 2 times a day. 60 Tablet 1    ondansetron (ZOFRAN ODT) 4 MG TABLET DISPERSIBLE Take 1 Tablet by mouth every 6 hours as needed for Nausea. 10 Tablet 0    montelukast (SINGULAIR) 10 MG Tab Take 10 mg by mouth.      EPINEPHrine (EPIPEN) 0.3 MG/0.3ML Solution Auto-injector solution for injection 0.3 mg by Intramuscular route Once PRN.      cetirizine (ZYRTEC) 10 MG Tab Take 10 mg by mouth 2 times a day.      diphenhydrAMINE (BENADRYL) 25 MG Tab Take 25 mg by mouth every 6  "hours as needed for Sleep.       No current Epic-ordered facility-administered medications on file.       Allergies:  Latex    Health Maintenance: pap smear recommended and pt states she will do this with her gynecologist. COVID19 booster recommendations given based on current CDC guidelines.  Flu shot declined    ROS:  Gen: no fevers/chills  Pulm: no sob, no cough  CV: no chest pain, no palpitations  GI: no nausea/vomiting, no diarrhea      Objective:       Exam:  /70 (BP Location: Right arm, Patient Position: Sitting, BP Cuff Size: Adult long)   Pulse 81   Temp 36.3 °C (97.4 °F) (Temporal)   Resp 20   Ht 1.702 m (5' 7\")   Wt 109 kg (240 lb)   LMP 12/10/2022   SpO2 95%   BMI 37.59 kg/m²  Body mass index is 37.59 kg/m².    Gen: Alert and oriented, No apparent distress.  Neck: Neck is supple without lymphadenopathy.  Lungs: Normal effort, CTA bilaterally, no wheezes, rhonchi, or rales  CV: Regular rate and rhythm. No murmurs, rubs, or gallops.  Ext: No clubbing, cyanosis, edema.  Psych Very pleasant.  Normal affect and mood.  Linear thought process, good insight, well groomed. PHQ9 = 6.  GAD7 =6.  No Si/HI      Assessment & Plan:     37 y.o. female with the following -     1. Generalized anxiety disorder  Chronic, improved but not optimally controlled.  Increase paroxetine 30 mg to 40 mg daily.  Continue dietary and lifestyle modifications.  Recommended she consider restarting therapy if symptoms do not continue to improve.  Follow-up and ER precautions given  - paroxetine (PAXIL) 40 MG tablet; Take 1 Tablet by mouth every day.  Dispense: 30 Tablet; Refill: 5    2. Current severe episode of major depressive disorder without psychotic features without prior episode (HCC)  Chronic, improved but not optimally controlled.  Increase paroxetine 30 mg to 40 mg daily.  Continue dietary and lifestyle modifications.  Recommended she consider restarting therapy if symptoms do not continue to improve.  Follow-up " and ER precautions given  - paroxetine (PAXIL) 40 MG tablet; Take 1 Tablet by mouth every day.  Dispense: 30 Tablet; Refill: 5    3. Family planning  Patient desires gynecology evaluation for possible hysterectomy.  Referral given.  I recommend she also get a Pap smear at that time.  - Referral to Gynecology    4. Hot flashes  Newly noted issue, mild, started over the last year.  Possibly due to very early perimenopause versus thyroid condition.  We will check thyroid per below.  She has no known TB exposures so we will hold TB screening at this time  - TSH WITH REFLEX TO FT4; Future    5. Healthcare maintenance  Patient is due for follow-up labs.  Labs were ordered on 4/25/22 and I have added an additional lipid and thyroid panel since she is having hot flashes and her weight.  Dietary and exercise guidance given  - TSH WITH REFLEX TO FT4; Future  - Lipid Profile; Future      Return in about 4 weeks (around 2/8/2023) for mood follow-up.    Please note that this dictation was created using voice recognition software. I have made every reasonable attempt to correct obvious errors, but I expect that there are errors of grammar and possibly content that I did not discover before finalizing the note.

## 2023-01-11 ENCOUNTER — OFFICE VISIT (OUTPATIENT)
Dept: MEDICAL GROUP | Facility: MEDICAL CENTER | Age: 38
End: 2023-01-11
Payer: COMMERCIAL

## 2023-01-11 VITALS
HEIGHT: 67 IN | SYSTOLIC BLOOD PRESSURE: 112 MMHG | OXYGEN SATURATION: 95 % | BODY MASS INDEX: 37.67 KG/M2 | RESPIRATION RATE: 20 BRPM | WEIGHT: 240 LBS | HEART RATE: 81 BPM | DIASTOLIC BLOOD PRESSURE: 70 MMHG | TEMPERATURE: 97.4 F

## 2023-01-11 DIAGNOSIS — R23.2 HOT FLASHES: ICD-10-CM

## 2023-01-11 DIAGNOSIS — F32.4 MAJOR DEPRESSIVE DISORDER WITH SINGLE EPISODE, IN PARTIAL REMISSION (HCC): ICD-10-CM

## 2023-01-11 DIAGNOSIS — F32.2 CURRENT SEVERE EPISODE OF MAJOR DEPRESSIVE DISORDER WITHOUT PSYCHOTIC FEATURES WITHOUT PRIOR EPISODE (HCC): ICD-10-CM

## 2023-01-11 DIAGNOSIS — Z00.00 HEALTHCARE MAINTENANCE: ICD-10-CM

## 2023-01-11 DIAGNOSIS — Z30.09 FAMILY PLANNING: ICD-10-CM

## 2023-01-11 DIAGNOSIS — F41.1 GENERALIZED ANXIETY DISORDER: ICD-10-CM

## 2023-01-11 PROBLEM — K21.9 GERD (GASTROESOPHAGEAL REFLUX DISEASE): Status: ACTIVE | Noted: 2021-04-26

## 2023-01-11 PROCEDURE — 99214 OFFICE O/P EST MOD 30 MIN: CPT | Performed by: FAMILY MEDICINE

## 2023-01-11 RX ORDER — PAROXETINE HYDROCHLORIDE 40 MG/1
40 TABLET, FILM COATED ORAL DAILY
Qty: 30 TABLET | Refills: 5 | Status: SHIPPED | OUTPATIENT
Start: 2023-01-11 | End: 2023-02-15 | Stop reason: SDUPTHER

## 2023-01-11 ASSESSMENT — PATIENT HEALTH QUESTIONNAIRE - PHQ9
6. FEELING BAD ABOUT YOURSELF - OR THAT YOU ARE A FAILURE OR HAVE LET YOURSELF OR YOUR FAMILY DOWN: NOT AL ALL
SUM OF ALL RESPONSES TO PHQ QUESTIONS 1-9: 6
CLINICAL INTERPRETATION OF PHQ2 SCORE: 1
3. TROUBLE FALLING OR STAYING ASLEEP OR SLEEPING TOO MUCH: SEVERAL DAYS
4. FEELING TIRED OR HAVING LITTLE ENERGY: SEVERAL DAYS
8. MOVING OR SPEAKING SO SLOWLY THAT OTHER PEOPLE COULD HAVE NOTICED. OR THE OPPOSITE, BEING SO FIGETY OR RESTLESS THAT YOU HAVE BEEN MOVING AROUND A LOT MORE THAN USUAL: SEVERAL DAYS
7. TROUBLE CONCENTRATING ON THINGS, SUCH AS READING THE NEWSPAPER OR WATCHING TELEVISION: SEVERAL DAYS
1. LITTLE INTEREST OR PLEASURE IN DOING THINGS: NOT AT ALL
9. THOUGHTS THAT YOU WOULD BE BETTER OFF DEAD, OR OF HURTING YOURSELF: NOT AT ALL
SUM OF ALL RESPONSES TO PHQ QUESTIONS 1-9: 6
5. POOR APPETITE OR OVEREATING: SEVERAL DAYS
2. FEELING DOWN, DEPRESSED, IRRITABLE, OR HOPELESS: SEVERAL DAYS
SUM OF ALL RESPONSES TO PHQ9 QUESTIONS 1 AND 2: 1
5. POOR APPETITE OR OVEREATING: 1 - SEVERAL DAYS

## 2023-01-11 ASSESSMENT — ANXIETY QUESTIONNAIRES
3. WORRYING TOO MUCH ABOUT DIFFERENT THINGS: SEVERAL DAYS
6. BECOMING EASILY ANNOYED OR IRRITABLE: NOT AT ALL
7. FEELING AFRAID AS IF SOMETHING AWFUL MIGHT HAPPEN: SEVERAL DAYS
2. NOT BEING ABLE TO STOP OR CONTROL WORRYING: SEVERAL DAYS
1. FEELING NERVOUS, ANXIOUS, OR ON EDGE: SEVERAL DAYS
5. BEING SO RESTLESS THAT IT IS HARD TO SIT STILL: SEVERAL DAYS
GAD7 TOTAL SCORE: 6
4. TROUBLE RELAXING: SEVERAL DAYS

## 2023-01-11 ASSESSMENT — FIBROSIS 4 INDEX: FIB4 SCORE: .4791574237499549349

## 2023-01-11 NOTE — ASSESSMENT & PLAN NOTE
Her wife carried her previous child and patient does not desire pregnancy to be done by herself.  She feels this could risk worsening her mood.

## 2023-01-11 NOTE — ASSESSMENT & PLAN NOTE
Chronic issue for years. She feels that her mood has been improving well with her medication and has no negative SEs to her medication.  She finds that the holiday season causes worsening of mood, but this is not as bad as prior to taking medication.  She has not been going to therapy (has been working 2 jobs and saving money for home and for another child) and is doing home lifestyle, dietary changes.  No active SI/Hi.  She has noticed some increased difficulty sleeping recently and trouble staying asleep.  She reports good support at home.

## 2023-02-14 NOTE — PROGRESS NOTES
Virtual Visit: Established Patient   This visit was conducted via Zoom using secure and encrypted videoconferencing technology.   The patient was in a private location outside of their home in the state of [place].    The patient's identity was confirmed and verbal consent was obtained for this virtual visit.     Subjective:   CC:   Chief Complaint   Patient presents with    Depression    Referral Needed       Allison Dionna Collins is a 38 y.o. female presenting for evaluation and management of:    Seasonal allergies- chronic issue for which she sees an allergist who is giving her injections.  She has some break through symptoms and allergies has recommended ENT evaluation.  No current sinus pressure, congestion or runny nose but these happen intermittently.    Depression and anxiety- she has been taking her paroxetine 40mg as prescribed.  She feels that her mood has improved well and feels like herself.  No SI/HI.  She continues to do lifestyle modifications.  She has been eating breakfast late and a late dinner and misses dinner at times.  She has not been exercising regularly.  She is working on regular food intake.  Weight is stable.    Nausea- chronic issue that is intermittent.  Sometimes occurs is she did not eat a lot or sometimes occurs when overeating.  No clear trigger.  She drinks water and symptoms resolve.  Pepto and tums improve symptoms.  She drinks caffeine daily but no change when does not take caffeine. No food getting stuck in stomach.  Some early satiety.  Stools are normal.    ROS   No chest pain, palpitations, SOB.  No nausea, vomiting.      Current medicines (including changes today)  Current Outpatient Medications   Medication Sig Dispense Refill    paroxetine (PAXIL) 40 MG tablet Take 1 Tablet by mouth every day. 90 Tablet 3    famotidine (PEPCID AC) 10 MG tablet Take 1 Tablet by mouth 2 times a day. 60 Tablet 1    ondansetron (ZOFRAN ODT) 4 MG TABLET DISPERSIBLE Take 1 Tablet by mouth every  "6 hours as needed for Nausea/Vomiting. 10 Tablet 0    montelukast (SINGULAIR) 10 MG Tab Take 10 mg by mouth.      EPINEPHrine (EPIPEN) 0.3 MG/0.3ML Solution Auto-injector solution for injection 0.3 mg by Intramuscular route Once PRN.      cetirizine (ZYRTEC) 10 MG Tab Take 10 mg by mouth every day.      diphenhydrAMINE (BENADRYL) 25 MG Tab Take 25 mg by mouth every 6 hours as needed for Sleep.       No current facility-administered medications for this visit.       Patient Active Problem List    Diagnosis Date Noted    Family planning 01/11/2023    Major depressive disorder with single episode, in partial remission (HCC) 09/27/2021    Generalized anxiety disorder 09/27/2021    Vegetarian diet 04/26/2021    Nausea 04/26/2021    Chronic pain of both knees 09/28/2020    Flat feet, bilateral 09/28/2020    Obesity (BMI 30-39.9) 05/13/2020    Allergic rhinitis 05/13/2020    Sore armpit, left 05/13/2020        Objective:   Pulse 81 Comment: Pt reported  Ht 1.702 m (5' 7\") Comment: Pt reported  Wt 110 kg (243 lb) Comment: Pt reported  LMP 01/09/2023   BMI 38.06 kg/m²     Physical Exam:  Constitutional: Alert, no distress, well-groomed.  Skin: No rashes in visible areas.  Eye: Round. Conjunctiva clear, lids normal. No icterus.   ENMT: Lips pink without lesions, good dentition, moist mucous membranes. Phonation normal.  Neck: No masses, no thyromegaly. Moves freely without pain.  Respiratory: Unlabored respiratory effort, no cough or audible wheeze  Psych: Alert and oriented x3, normal affect and mood.  No SI/HI    Assessment and Plan:   The following treatment plan was discussed:     1. Allergic rhinitis, unspecified seasonality, unspecified trigger  Chronic issue, persistent.  The ENT referral given.  To continue with her allergist for medication monitoring and titration  - Referral to ENT    2. Nausea  Chronic issue, persistent despite Pepcid use.  Symptoms temporarily improved with Tums and Zofran.  Recommended GERD " prevention diet and lactose-free diet.  GI referral given due to chronicity of her symptoms  - Referral to Gastroenterology  - famotidine (PEPCID AC) 10 MG tablet; Take 1 Tablet by mouth 2 times a day.  Dispense: 60 Tablet; Refill: 1  - ondansetron (ZOFRAN ODT) 4 MG TABLET DISPERSIBLE; Take 1 Tablet by mouth every 6 hours as needed for Nausea/Vomiting.  Dispense: 10 Tablet; Refill: 0    3. Generalized anxiety disorder  Chronic, improved.  No negative side effects to Paxil reported.  No SI/HI.  We will continue current medication.  Follow-up precautions given  - paroxetine (PAXIL) 40 MG tablet; Take 1 Tablet by mouth every day.  Dispense: 90 Tablet; Refill: 3    4. Current severe episode of major depressive disorder without psychotic features without prior episode (HCC)  Chronic, improved.  No negative side effects to Paxil reported.  No SI/HI.  We will continue current medication.  Follow-up precautions given  - paroxetine (PAXIL) 40 MG tablet; Take 1 Tablet by mouth every day.  Dispense: 90 Tablet; Refill: 3        Follow-up: Return in about 3 months (around 5/15/2023) for establish care with new PCP. Since Dr. Li is transferring

## 2023-02-15 ENCOUNTER — TELEMEDICINE (OUTPATIENT)
Dept: MEDICAL GROUP | Facility: MEDICAL CENTER | Age: 38
End: 2023-02-15
Payer: COMMERCIAL

## 2023-02-15 VITALS — WEIGHT: 243 LBS | BODY MASS INDEX: 38.14 KG/M2 | HEIGHT: 67 IN | HEART RATE: 81 BPM

## 2023-02-15 DIAGNOSIS — F41.1 GENERALIZED ANXIETY DISORDER: ICD-10-CM

## 2023-02-15 DIAGNOSIS — R11.0 NAUSEA: ICD-10-CM

## 2023-02-15 DIAGNOSIS — F32.2 CURRENT SEVERE EPISODE OF MAJOR DEPRESSIVE DISORDER WITHOUT PSYCHOTIC FEATURES WITHOUT PRIOR EPISODE (HCC): ICD-10-CM

## 2023-02-15 DIAGNOSIS — J30.9 ALLERGIC RHINITIS, UNSPECIFIED SEASONALITY, UNSPECIFIED TRIGGER: ICD-10-CM

## 2023-02-15 PROCEDURE — 99214 OFFICE O/P EST MOD 30 MIN: CPT | Mod: 95 | Performed by: FAMILY MEDICINE

## 2023-02-15 RX ORDER — ONDANSETRON 4 MG/1
4 TABLET, ORALLY DISINTEGRATING ORAL EVERY 6 HOURS PRN
Qty: 10 TABLET | Refills: 0 | Status: SHIPPED | OUTPATIENT
Start: 2023-02-15

## 2023-02-15 RX ORDER — FAMOTIDINE 10 MG
10 TABLET ORAL 2 TIMES DAILY
Qty: 60 TABLET | Refills: 1 | Status: SHIPPED | OUTPATIENT
Start: 2023-02-15

## 2023-02-15 RX ORDER — PAROXETINE HYDROCHLORIDE 40 MG/1
40 TABLET, FILM COATED ORAL DAILY
Qty: 90 TABLET | Refills: 3 | Status: SHIPPED | OUTPATIENT
Start: 2023-02-15

## 2023-02-15 ASSESSMENT — FIBROSIS 4 INDEX: FIB4 SCORE: 0.49

## 2023-02-15 ASSESSMENT — PATIENT HEALTH QUESTIONNAIRE - PHQ9
5. POOR APPETITE OR OVEREATING: 1 - SEVERAL DAYS
CLINICAL INTERPRETATION OF PHQ2 SCORE: 0

## 2023-05-02 ENCOUNTER — OFFICE VISIT (OUTPATIENT)
Dept: URGENT CARE | Facility: CLINIC | Age: 38
End: 2023-05-02
Payer: COMMERCIAL

## 2023-05-02 VITALS
HEIGHT: 67 IN | RESPIRATION RATE: 18 BRPM | SYSTOLIC BLOOD PRESSURE: 112 MMHG | OXYGEN SATURATION: 100 % | WEIGHT: 245 LBS | BODY MASS INDEX: 38.45 KG/M2 | DIASTOLIC BLOOD PRESSURE: 74 MMHG | TEMPERATURE: 99.1 F | HEART RATE: 107 BPM

## 2023-05-02 DIAGNOSIS — J02.0 STREP PHARYNGITIS: ICD-10-CM

## 2023-05-02 DIAGNOSIS — J02.9 SORE THROAT: ICD-10-CM

## 2023-05-02 LAB
INT CON NEG: NORMAL
INT CON POS: NORMAL
S PYO AG THROAT QL: POSITIVE

## 2023-05-02 PROCEDURE — 99213 OFFICE O/P EST LOW 20 MIN: CPT | Performed by: PHYSICIAN ASSISTANT

## 2023-05-02 PROCEDURE — 87880 STREP A ASSAY W/OPTIC: CPT | Performed by: PHYSICIAN ASSISTANT

## 2023-05-02 RX ORDER — OMALIZUMAB 150 MG/ML
INJECTION, SOLUTION SUBCUTANEOUS
COMMUNITY
Start: 2023-04-27

## 2023-05-02 RX ORDER — AMOXICILLIN 500 MG/1
1000 CAPSULE ORAL DAILY
Qty: 20 CAPSULE | Refills: 0 | Status: SHIPPED | OUTPATIENT
Start: 2023-05-02 | End: 2023-05-12

## 2023-05-02 ASSESSMENT — ENCOUNTER SYMPTOMS
COUGH: 0
SWOLLEN GLANDS: 1
HOARSE VOICE: 1

## 2023-05-02 ASSESSMENT — FIBROSIS 4 INDEX: FIB4 SCORE: 0.49

## 2023-05-02 NOTE — LETTER
[facility]  [address]  [place] [address]     May 2, 2023    Patient: Allison Collins   YOB: 1985   Date of Visit: 5/2/2023       To Whom It May Concern:    Allison Collins was seen and treated in our department on 5/2/2023. Please excuse her from work on 5/3/23.    Sincerely,     Inder Perry P.A.-C.

## 2023-05-03 NOTE — PROGRESS NOTES
Subjective:   Allison Collins is a 38 y.o. female who presents for Pharyngitis (X1day, stiff and pain in neck, started to fell lightheaded fatigued, )        Pharyngitis   This is a new problem. The current episode started yesterday. The problem has been rapidly worsening. Neither side of throat is experiencing more pain than the other. Associated symptoms include a hoarse voice and swollen glands. Pertinent negatives include no congestion or coughing. Associated symptoms comments: Pain with swallowing. She has had no exposure to strep or mono. She has tried nothing for the symptoms. The treatment provided no relief.   Review of Systems   HENT:  Positive for hoarse voice. Negative for congestion.    Respiratory:  Negative for cough.      PMH:  has a past medical history of Allergic rhinitis (5/13/2020).    She has no past medical history of Asthma.  MEDS:   Current Outpatient Medications:     XOLAIR 150 MG/ML Solution Prefilled Syringe, , Disp: , Rfl:     amoxicillin (AMOXIL) 500 MG Cap, Take 2 Capsules by mouth every day for 10 days., Disp: 20 Capsule, Rfl: 0    paroxetine (PAXIL) 40 MG tablet, Take 1 Tablet by mouth every day., Disp: 90 Tablet, Rfl: 3    famotidine (PEPCID AC) 10 MG tablet, Take 1 Tablet by mouth 2 times a day., Disp: 60 Tablet, Rfl: 1    montelukast (SINGULAIR) 10 MG Tab, Take 10 mg by mouth., Disp: , Rfl:     EPINEPHrine (EPIPEN) 0.3 MG/0.3ML Solution Auto-injector solution for injection, 0.3 mg by Intramuscular route Once PRN., Disp: , Rfl:     cetirizine (ZYRTEC) 10 MG Tab, Take 10 mg by mouth every day., Disp: , Rfl:     diphenhydrAMINE (BENADRYL) 25 MG Tab, Take 25 mg by mouth every 6 hours as needed for Sleep., Disp: , Rfl:     ondansetron (ZOFRAN ODT) 4 MG TABLET DISPERSIBLE, Take 1 Tablet by mouth every 6 hours as needed for Nausea/Vomiting. (Patient not taking: Reported on 5/2/2023), Disp: 10 Tablet, Rfl: 0  ALLERGIES:   Allergies   Allergen Reactions    Latex      Hives     SURGHX:  "  Past Surgical History:   Procedure Laterality Date    DENTAL EXTRACTION(S)      wisdom teeth extraction     SOCHX:  reports that she has quit smoking. Her smoking use included cigars and cigarettes. She has never used smokeless tobacco. She reports current alcohol use. She reports that she does not currently use drugs.  FH: Family history was reviewed, no pertinent findings to report   Objective:   /74   Pulse (!) 107   Temp 37.3 °C (99.1 °F) (Temporal)   Resp 18   Ht 1.702 m (5' 7\")   Wt 111 kg (245 lb)   SpO2 100%   BMI 38.37 kg/m²   Physical Exam  Vitals reviewed.   Constitutional:       General: She is not in acute distress.     Appearance: Normal appearance. She is well-developed. She is not toxic-appearing.   HENT:      Head: Normocephalic and atraumatic.      Right Ear: Tympanic membrane, ear canal and external ear normal.      Left Ear: Tympanic membrane, ear canal and external ear normal.      Nose: Nose normal. No congestion or rhinorrhea.      Mouth/Throat:      Lips: Pink.      Mouth: Mucous membranes are moist.      Pharynx: Oropharynx is clear. Uvula midline. Posterior oropharyngeal erythema present. No oropharyngeal exudate or uvula swelling.   Cardiovascular:      Rate and Rhythm: Regular rhythm. Tachycardia present.      Heart sounds: Normal heart sounds, S1 normal and S2 normal.   Pulmonary:      Effort: Pulmonary effort is normal. No respiratory distress.      Breath sounds: Normal breath sounds. No stridor. No decreased breath sounds, wheezing, rhonchi or rales.   Lymphadenopathy:      Cervical: Cervical adenopathy present.      Right cervical: Superficial cervical adenopathy present. No posterior cervical adenopathy.     Left cervical: Superficial cervical adenopathy present. No posterior cervical adenopathy.   Skin:     General: Skin is dry.   Neurological:      Comments: Alert and oriented.    Psychiatric:         Speech: Speech normal.         Behavior: Behavior normal.       "   Assessment/Plan:   1. Strep pharyngitis  - amoxicillin (AMOXIL) 500 MG Cap; Take 2 Capsules by mouth every day for 10 days.  Dispense: 20 Capsule; Refill: 0    2. Sore throat  - POCT Rapid Strep A    Other orders  - XOLAIR 150 MG/ML Solution Prefilled Syringe    Testing for group A strep is positive.  We will tx with abx.    Pt instructed to complete full course of medication despite symptomatic improvement. Pt to take med with meals to alleviate GI upset.  Recommend taking a probiotic concurrently.    You are contagious for 24hrs after starting abx.  Recommend good hand hygiene and refraining from sharing food or drinks.  Change toothbrush 48 hours after beginning the antibiotics to avoid reinfection.  I also recommend sanitizing any reusable water bottles.      Salt water gargles, hot tea with honey, lozenges and ibuprofen as needed for pain.      If symptoms fail to improve within 48 hours, new symptoms develop, symptoms worsen see primary care provider or return to clinic for reevaluation.    If patient develops severe symptoms such as drooling/difficulty swallowing, difficulty opening their mouth, facial/neck redness or swelling, audible stridor or wheezing, difficulty moving their neck or other severe and concerning symptoms-I recommend that they go to the emergency room for further evaluation and management.

## 2023-05-04 ENCOUNTER — TELEPHONE (OUTPATIENT)
Dept: SCHEDULING | Facility: IMAGING CENTER | Age: 38
End: 2023-05-04

## 2023-05-24 ENCOUNTER — OFFICE VISIT (OUTPATIENT)
Dept: URGENT CARE | Facility: CLINIC | Age: 38
End: 2023-05-24
Payer: COMMERCIAL

## 2023-05-24 VITALS
OXYGEN SATURATION: 98 % | SYSTOLIC BLOOD PRESSURE: 118 MMHG | HEIGHT: 67 IN | HEART RATE: 75 BPM | DIASTOLIC BLOOD PRESSURE: 72 MMHG | TEMPERATURE: 98 F | WEIGHT: 246 LBS | BODY MASS INDEX: 38.61 KG/M2 | RESPIRATION RATE: 16 BRPM

## 2023-05-24 DIAGNOSIS — J30.2 SEASONAL ALLERGIC RHINITIS, UNSPECIFIED TRIGGER: ICD-10-CM

## 2023-05-24 DIAGNOSIS — J06.9 VIRAL URI WITH COUGH: ICD-10-CM

## 2023-05-24 DIAGNOSIS — R09.81 SINUS CONGESTION: ICD-10-CM

## 2023-05-24 PROCEDURE — 3074F SYST BP LT 130 MM HG: CPT | Performed by: PHYSICIAN ASSISTANT

## 2023-05-24 PROCEDURE — 3078F DIAST BP <80 MM HG: CPT | Performed by: PHYSICIAN ASSISTANT

## 2023-05-24 PROCEDURE — 99214 OFFICE O/P EST MOD 30 MIN: CPT | Performed by: PHYSICIAN ASSISTANT

## 2023-05-24 RX ORDER — METHYLPREDNISOLONE 4 MG/1
TABLET ORAL
Qty: 1 EACH | Refills: 0 | Status: SHIPPED | OUTPATIENT
Start: 2023-05-24

## 2023-05-24 RX ORDER — DEXTROMETHORPHAN HYDROBROMIDE AND PROMETHAZINE HYDROCHLORIDE 15; 6.25 MG/5ML; MG/5ML
5 SYRUP ORAL EVERY 4 HOURS PRN
Qty: 120 ML | Refills: 0 | Status: SHIPPED | OUTPATIENT
Start: 2023-05-24

## 2023-05-24 ASSESSMENT — ENCOUNTER SYMPTOMS
NAUSEA: 0
FEVER: 0
ABDOMINAL PAIN: 0
SINUS PAIN: 1
WHEEZING: 0
SPUTUM PRODUCTION: 1
COUGH: 1
SHORTNESS OF BREATH: 0
VOMITING: 0
CHILLS: 0
DIARRHEA: 0
SORE THROAT: 1

## 2023-05-24 ASSESSMENT — FIBROSIS 4 INDEX: FIB4 SCORE: 0.49

## 2023-05-24 NOTE — LETTER
[place]  [facility] [place] [id number] [facility] [address]     May 24, 2023    Patient: Allison Collins   YOB: 1985   Date of Visit: 5/24/2023       To Whom It May Concern:    Allison Collins was seen and treated in our department on 5/24/2023.  She should be excused from missed work for yesterday today and tomorrow.    Sincerely,     Colt Valdez P.A.-C.

## 2023-05-24 NOTE — PROGRESS NOTES
"Subjective:   Allison Collins  is a 38 y.o. female who presents for Sore Throat (Started Monday ) and Nasal Congestion (Facial pressure. Possible sinus infection )      Pharyngitis   This is a new problem. The current episode started in the past 7 days. Associated symptoms include congestion and coughing. Pertinent negatives include no abdominal pain, diarrhea, ear pain, shortness of breath or vomiting.   Patient presents to urgent care noting nasal congestion and sinus pressure primarily over frontal sinuses times last 2 to 3 days.  Patient denies fevers chills.  Notes some purulent nasal drainage.  Complains of fullness to the ears.  Did have some sore throat that has improved over the last 24 hours.  Denies nausea vomiting abdominal pain or diarrhea.  Patient was treated for strep pharyngitis 3 weeks ago and reports improvement of bad sore throat with treatment medication and states the sore throat has been more mild than that.  Patient denies history of asthma.  Does use a multitude of treatments for allergic rhinitis including antihistamines, nasal spray, Singulair and serial injections.    Review of Systems   Constitutional:  Negative for chills and fever.   HENT:  Positive for congestion, sinus pain and sore throat. Negative for ear pain.    Respiratory:  Positive for cough and sputum production. Negative for shortness of breath and wheezing.    Gastrointestinal:  Negative for abdominal pain, diarrhea, nausea and vomiting.   Skin:  Negative for rash.   Endo/Heme/Allergies:  Positive for environmental allergies.       Allergies   Allergen Reactions    Latex      Hives        Objective:   /72   Pulse 75   Temp 36.7 °C (98 °F)   Resp 16   Ht 1.702 m (5' 7\")   Wt 112 kg (246 lb)   SpO2 98%   BMI 38.53 kg/m²     Physical Exam  Vitals and nursing note reviewed.   Constitutional:       General: She is not in acute distress.     Appearance: She is well-developed. She is not diaphoretic.   HENT:      " Head: Normocephalic and atraumatic.      Right Ear: Tympanic membrane, ear canal and external ear normal. Tympanic membrane is not erythematous.      Left Ear: Tympanic membrane, ear canal and external ear normal. Tympanic membrane is not erythematous.      Nose:      Right Sinus: Frontal sinus tenderness present. No maxillary sinus tenderness.      Left Sinus: Frontal sinus tenderness present. No maxillary sinus tenderness.      Mouth/Throat:      Lips: Pink.      Mouth: Mucous membranes are moist.      Pharynx: Uvula midline. Posterior oropharyngeal erythema ( mild PND) present. No oropharyngeal exudate or uvula swelling.      Tonsils: No tonsillar abscesses.   Eyes:      General: Lids are normal. No scleral icterus.        Right eye: No discharge.         Left eye: No discharge.      Conjunctiva/sclera: Conjunctivae normal.   Pulmonary:      Effort: Pulmonary effort is normal. No respiratory distress.      Breath sounds: Normal breath sounds. No stridor. No decreased breath sounds, wheezing, rhonchi or rales.   Musculoskeletal:         General: Normal range of motion.      Cervical back: Neck supple.   Skin:     General: Skin is warm and dry.      Coloration: Skin is not pale.      Findings: No erythema.   Neurological:      Mental Status: She is alert and oriented to person, place, and time. She is not disoriented.   Psychiatric:         Speech: Speech normal.         Behavior: Behavior normal.         Assessment/Plan:   1. Sinus congestion  - methylPREDNISolone (MEDROL DOSEPAK) 4 MG Tablet Therapy Pack; Take as directed on package.  Dispense one package.  Dispense: 1 Each; Refill: 0    2. Seasonal allergic rhinitis, unspecified trigger  - methylPREDNISolone (MEDROL DOSEPAK) 4 MG Tablet Therapy Pack; Take as directed on package.  Dispense one package.  Dispense: 1 Each; Refill: 0    3. Viral URI with cough  - promethazine-dextromethorphan (PROMETHAZINE-DM) 6.25-15 MG/5ML syrup; Take 5 mL by mouth every four  hours as needed for Cough.  Dispense: 120 mL; Refill: 0  Supportive care is reviewed with patient/caregiver - recommend to push PO fluids and electrolytes, Cautioned regarding potential side effects of steroid, avoid nsaids while using  Cautioned regarding potential for sedation with medication.  Return to clinic with lack of resolution or progression of symptoms.  We discussed continuing treatment for chronic allergic rhinitis.  Should have some benefit with oral corticosteroid.    I have worn an N95 mask, gloves and eye protection for the entire encounter with this patient.     Differential diagnosis, natural history, supportive care, and indications for immediate follow-up discussed.

## 2023-06-02 ENCOUNTER — HOSPITAL ENCOUNTER (OUTPATIENT)
Facility: MEDICAL CENTER | Age: 38
End: 2023-06-02
Payer: COMMERCIAL

## 2023-06-02 PROCEDURE — 82653 EL-1 FECAL QUANTITATIVE: CPT

## 2023-06-02 PROCEDURE — 83993 ASSAY FOR CALPROTECTIN FECAL: CPT

## 2023-06-02 PROCEDURE — 82705 FATS/LIPIDS FECES QUAL: CPT

## 2023-06-02 PROCEDURE — 87328 CRYPTOSPORIDIUM AG IA: CPT

## 2023-06-02 PROCEDURE — 87329 GIARDIA AG IA: CPT

## 2023-06-13 LAB
G LAMBLIA+C PARVUM AG STL QL RAPID: NORMAL
SIGNIFICANT IND 70042: NORMAL
SITE SITE: NORMAL
SOURCE SOURCE: NORMAL

## 2023-06-15 LAB
FAT STL QL: NORMAL
NEUTRAL FAT STL QL: NORMAL

## 2023-06-16 LAB
CALPROTECTIN STL-MCNT: 9 UG/G
ELASTASE PANC STL-MCNT: >800 UG/G

## 2023-06-23 ENCOUNTER — APPOINTMENT (OUTPATIENT)
Dept: RADIOLOGY | Facility: MEDICAL CENTER | Age: 38
End: 2023-06-23
Payer: COMMERCIAL

## 2023-11-27 ENCOUNTER — APPOINTMENT (OUTPATIENT)
Dept: URGENT CARE | Facility: PHYSICIAN GROUP | Age: 38
End: 2023-11-27
Payer: COMMERCIAL

## 2024-05-06 SDOH — ECONOMIC STABILITY: FOOD INSECURITY: WITHIN THE PAST 12 MONTHS, THE FOOD YOU BOUGHT JUST DIDN'T LAST AND YOU DIDN'T HAVE MONEY TO GET MORE.: NEVER TRUE

## 2024-05-06 SDOH — ECONOMIC STABILITY: TRANSPORTATION INSECURITY
IN THE PAST 12 MONTHS, HAS LACK OF RELIABLE TRANSPORTATION KEPT YOU FROM MEDICAL APPOINTMENTS, MEETINGS, WORK OR FROM GETTING THINGS NEEDED FOR DAILY LIVING?: NO

## 2024-05-06 SDOH — ECONOMIC STABILITY: INCOME INSECURITY: IN THE LAST 12 MONTHS, WAS THERE A TIME WHEN YOU WERE NOT ABLE TO PAY THE MORTGAGE OR RENT ON TIME?: NO

## 2024-05-06 SDOH — HEALTH STABILITY: PHYSICAL HEALTH: ON AVERAGE, HOW MANY MINUTES DO YOU ENGAGE IN EXERCISE AT THIS LEVEL?: 30 MIN

## 2024-05-06 SDOH — ECONOMIC STABILITY: HOUSING INSECURITY: IN THE LAST 12 MONTHS, HOW MANY PLACES HAVE YOU LIVED?: 2

## 2024-05-06 SDOH — HEALTH STABILITY: PHYSICAL HEALTH: ON AVERAGE, HOW MANY DAYS PER WEEK DO YOU ENGAGE IN MODERATE TO STRENUOUS EXERCISE (LIKE A BRISK WALK)?: 3 DAYS

## 2024-05-06 SDOH — ECONOMIC STABILITY: INCOME INSECURITY: HOW HARD IS IT FOR YOU TO PAY FOR THE VERY BASICS LIKE FOOD, HOUSING, MEDICAL CARE, AND HEATING?: NOT HARD AT ALL

## 2024-05-06 SDOH — ECONOMIC STABILITY: TRANSPORTATION INSECURITY
IN THE PAST 12 MONTHS, HAS LACK OF TRANSPORTATION KEPT YOU FROM MEETINGS, WORK, OR FROM GETTING THINGS NEEDED FOR DAILY LIVING?: NO

## 2024-05-06 SDOH — ECONOMIC STABILITY: HOUSING INSECURITY
IN THE LAST 12 MONTHS, WAS THERE A TIME WHEN YOU DID NOT HAVE A STEADY PLACE TO SLEEP OR SLEPT IN A SHELTER (INCLUDING NOW)?: NO

## 2024-05-06 SDOH — HEALTH STABILITY: MENTAL HEALTH
STRESS IS WHEN SOMEONE FEELS TENSE, NERVOUS, ANXIOUS, OR CAN'T SLEEP AT NIGHT BECAUSE THEIR MIND IS TROUBLED. HOW STRESSED ARE YOU?: RATHER MUCH

## 2024-05-06 SDOH — ECONOMIC STABILITY: FOOD INSECURITY: WITHIN THE PAST 12 MONTHS, YOU WORRIED THAT YOUR FOOD WOULD RUN OUT BEFORE YOU GOT MONEY TO BUY MORE.: NEVER TRUE

## 2024-05-06 SDOH — ECONOMIC STABILITY: TRANSPORTATION INSECURITY
IN THE PAST 12 MONTHS, HAS THE LACK OF TRANSPORTATION KEPT YOU FROM MEDICAL APPOINTMENTS OR FROM GETTING MEDICATIONS?: NO

## 2024-05-06 ASSESSMENT — SOCIAL DETERMINANTS OF HEALTH (SDOH)
IN A TYPICAL WEEK, HOW MANY TIMES DO YOU TALK ON THE PHONE WITH FAMILY, FRIENDS, OR NEIGHBORS?: MORE THAN THREE TIMES A WEEK
DO YOU BELONG TO ANY CLUBS OR ORGANIZATIONS SUCH AS CHURCH GROUPS UNIONS, FRATERNAL OR ATHLETIC GROUPS, OR SCHOOL GROUPS?: NO
HOW OFTEN DO YOU HAVE A DRINK CONTAINING ALCOHOL: 2-3 TIMES A WEEK
HOW OFTEN DO YOU GET TOGETHER WITH FRIENDS OR RELATIVES?: ONCE A WEEK
WITHIN THE PAST 12 MONTHS, YOU WORRIED THAT YOUR FOOD WOULD RUN OUT BEFORE YOU GOT THE MONEY TO BUY MORE: NEVER TRUE
HOW OFTEN DO YOU ATTENT MEETINGS OF THE CLUB OR ORGANIZATION YOU BELONG TO?: NEVER
HOW OFTEN DO YOU ATTENT MEETINGS OF THE CLUB OR ORGANIZATION YOU BELONG TO?: NEVER
HOW OFTEN DO YOU GET TOGETHER WITH FRIENDS OR RELATIVES?: ONCE A WEEK
HOW OFTEN DO YOU ATTEND CHURCH OR RELIGIOUS SERVICES?: NEVER
HOW OFTEN DO YOU ATTEND CHURCH OR RELIGIOUS SERVICES?: NEVER
DO YOU BELONG TO ANY CLUBS OR ORGANIZATIONS SUCH AS CHURCH GROUPS UNIONS, FRATERNAL OR ATHLETIC GROUPS, OR SCHOOL GROUPS?: NO
HOW HARD IS IT FOR YOU TO PAY FOR THE VERY BASICS LIKE FOOD, HOUSING, MEDICAL CARE, AND HEATING?: NOT HARD AT ALL
HOW MANY DRINKS CONTAINING ALCOHOL DO YOU HAVE ON A TYPICAL DAY WHEN YOU ARE DRINKING: 1 OR 2
IN A TYPICAL WEEK, HOW MANY TIMES DO YOU TALK ON THE PHONE WITH FAMILY, FRIENDS, OR NEIGHBORS?: MORE THAN THREE TIMES A WEEK
HOW OFTEN DO YOU HAVE SIX OR MORE DRINKS ON ONE OCCASION: NEVER

## 2024-05-06 ASSESSMENT — LIFESTYLE VARIABLES
HOW OFTEN DO YOU HAVE SIX OR MORE DRINKS ON ONE OCCASION: NEVER
SKIP TO QUESTIONS 9-10: 1
HOW MANY STANDARD DRINKS CONTAINING ALCOHOL DO YOU HAVE ON A TYPICAL DAY: 1 OR 2
HOW OFTEN DO YOU HAVE A DRINK CONTAINING ALCOHOL: 2-3 TIMES A WEEK
AUDIT-C TOTAL SCORE: 3

## 2024-05-09 ENCOUNTER — OFFICE VISIT (OUTPATIENT)
Dept: MEDICAL GROUP | Facility: MEDICAL CENTER | Age: 39
End: 2024-05-09
Payer: COMMERCIAL

## 2024-05-09 VITALS
HEIGHT: 67 IN | WEIGHT: 252.2 LBS | OXYGEN SATURATION: 97 % | HEART RATE: 82 BPM | BODY MASS INDEX: 39.58 KG/M2 | DIASTOLIC BLOOD PRESSURE: 60 MMHG | TEMPERATURE: 97.2 F | SYSTOLIC BLOOD PRESSURE: 90 MMHG

## 2024-05-09 DIAGNOSIS — F33.1 MODERATE EPISODE OF RECURRENT MAJOR DEPRESSIVE DISORDER (HCC): Primary | ICD-10-CM

## 2024-05-09 DIAGNOSIS — E55.9 VITAMIN D DEFICIENCY: ICD-10-CM

## 2024-05-09 DIAGNOSIS — Z91.89 AT RISK FOR SLEEP APNEA: ICD-10-CM

## 2024-05-09 DIAGNOSIS — J30.9 ALLERGIC RHINITIS, UNSPECIFIED SEASONALITY, UNSPECIFIED TRIGGER: ICD-10-CM

## 2024-05-09 DIAGNOSIS — Z23 NEED FOR VACCINATION: ICD-10-CM

## 2024-05-09 DIAGNOSIS — F32.2 CURRENT SEVERE EPISODE OF MAJOR DEPRESSIVE DISORDER WITHOUT PSYCHOTIC FEATURES WITHOUT PRIOR EPISODE (HCC): ICD-10-CM

## 2024-05-09 DIAGNOSIS — F41.1 GENERALIZED ANXIETY DISORDER: ICD-10-CM

## 2024-05-09 DIAGNOSIS — Z12.4 SCREENING FOR CERVICAL CANCER: ICD-10-CM

## 2024-05-09 DIAGNOSIS — K21.9 GASTROESOPHAGEAL REFLUX DISEASE, UNSPECIFIED WHETHER ESOPHAGITIS PRESENT: ICD-10-CM

## 2024-05-09 DIAGNOSIS — Z00.00 HEALTHCARE MAINTENANCE: ICD-10-CM

## 2024-05-09 PROCEDURE — 90746 HEPB VACCINE 3 DOSE ADULT IM: CPT | Performed by: STUDENT IN AN ORGANIZED HEALTH CARE EDUCATION/TRAINING PROGRAM

## 2024-05-09 PROCEDURE — 99214 OFFICE O/P EST MOD 30 MIN: CPT | Mod: 25 | Performed by: STUDENT IN AN ORGANIZED HEALTH CARE EDUCATION/TRAINING PROGRAM

## 2024-05-09 PROCEDURE — 3074F SYST BP LT 130 MM HG: CPT | Performed by: STUDENT IN AN ORGANIZED HEALTH CARE EDUCATION/TRAINING PROGRAM

## 2024-05-09 PROCEDURE — 90471 IMMUNIZATION ADMIN: CPT | Performed by: STUDENT IN AN ORGANIZED HEALTH CARE EDUCATION/TRAINING PROGRAM

## 2024-05-09 PROCEDURE — 3078F DIAST BP <80 MM HG: CPT | Performed by: STUDENT IN AN ORGANIZED HEALTH CARE EDUCATION/TRAINING PROGRAM

## 2024-05-09 RX ORDER — PAROXETINE HYDROCHLORIDE 40 MG/1
40 TABLET, FILM COATED ORAL DAILY
Qty: 90 TABLET | Refills: 3 | Status: SHIPPED | OUTPATIENT
Start: 2024-05-09

## 2024-05-09 RX ORDER — OMEPRAZOLE 20 MG/1
20 CAPSULE, DELAYED RELEASE ORAL DAILY
Qty: 30 CAPSULE | Refills: 2 | Status: SHIPPED | OUTPATIENT
Start: 2024-05-09

## 2024-05-09 ASSESSMENT — PATIENT HEALTH QUESTIONNAIRE - PHQ9
5. POOR APPETITE OR OVEREATING: 1 - SEVERAL DAYS
SUM OF ALL RESPONSES TO PHQ QUESTIONS 1-9: 9
CLINICAL INTERPRETATION OF PHQ2 SCORE: 2

## 2024-05-09 NOTE — PROGRESS NOTES
Subjective:     CC:   Chief Complaint   Patient presents with    Establish Care    Referral Needed     For allergist and obgyn           HPI , Assessment & Plan:     39 y.o. female with the following -     1.  Patient is due for hepatitis vaccination- Hepatitis B Vaccine Adult 20+    2. Moderate episode of recurrent major depressive disorder (HCC)  This is a chronic problem  No SI or HI .   Has a toddler at home and pregnant wife - general stress related to this .  Patient on Paxil 40 mg daily for a long time  Reports that 3 weeks back she ran out of the prescription and has not been taking the medication.  Requesting refill  Plan  Counseling and education provided that this medication should never be stopped abruptly and to reach out to providers office if needed refills.  Patient verbalized understanding.  In the past following up with psychiatry and psychology but later both were discontinued.  Restarted  Following up with psychologist for therapy -started 2 months back  Refill of Paxil 40 mg daily provided  - paroxetine (PAXIL) 40 MG tablet; Take 1 Tablet by mouth every day.  Dispense: 90 Tablet; Refill: 3    3. Allergic rhinitis, unspecified seasonality, unspecified trigger  Chronic problem  Currently taking over-the-counter Zyrtec 10 mg once a day or Benadryl 25 mg every 6 hours as needed for allergies  Takes Pepcid 10 mg twice daily  Previously taking Singulair 10 mg daily which she has discontinue as did not show any improvement.  Patient requesting referral to allergy specialist for further evaluation and management  Plan  Okay to continue OTC Zyrtec 10 mg once a day as needed  Continue Pepcid 10 mg twice daily  - Referral to Allergy    4. Screening for cervical cancer  Patient is due for Pap smear reports that she has anxiety getting Pap smears so she has not had this procedure for more than 10 years.  Requesting for gynecology referral.  We did discuss that Pap smear can be done in our office as well by  me but given her concerns she would prefer to see a specialist.  I will place a referral to gynecology   - Referral to Gynecology    5. GERD   Chronic condition  Symptoms of nausea and heartburn intermittent on pepcid 40 mg nightly   Denies:   - epigastric pain.   -  vomiting, or diarrhea  - dysphagia  - abnormal cough  - blood in the stool or dark tarry stools.  - early satiety  - tobacco use.    Plan:  Recommended omeprazole 20 mg in am daily   Pepcid 40 mg at night   Weight loss   Appropriate counseling given.  Discussed - weight loss, elevated the head of the bed, avoid common food triggers (fatty or fried foods, tomato sauce, alcohol, chocolate, mint, garlic, onion, and caffeine)  Recommended- smoking cessation, alcohol use   - omeprazole (PRILOSEC) 20 MG delayed-release capsule; Take 1 Capsule by mouth every day.  Dispense: 30 Capsule; Refill: 2    6. At risk for sleep apnea     [facility] - Sleep Apnea Screening      Flowsheet Row Most Recent Value   S - Have you been told that you SNORE?  Yes Yes   T - Are you often TIRED during the day?  Yes Yes   O - Do you know if you stop breathing or has anyone witnessed you stop breathing while you were asleep? (OBSTRUCTION) yes Yes   P - Do you have high blood PRESSURE or on medication to control high blood pressure?  No No   B - Is your Body Mass Index greater than 35? (BMI) yes Yes   A - Are you 50 years old or older? (AGE) - No    No   N - Are you a male with a NECK circumference greater than 17 inches, or a female with a neck circumference greater than 16 inches?  Yes Yes   G - Are you male? (GENDER) - No  No   [facility] Total Score : 5 5   KATY Risk - High High Risk   Plan:  Weight loss   - Referral to Pulmonary and Sleep Medicine      7. BMI 39.0-39.9,adult  Chronic, uncontrolled  - Lipid Profile; Future  - HEMOGLOBIN A1C; Future  - Patient identified as having weight management issue.  Appropriate orders and counseling given.        Labs ordered   - Comp Metabolic  "Panel; Future  - Lipid Profile; Future  - CBC WITHOUT DIFFERENTIAL; Future  - TSH WITH REFLEX TO FT4; Future  - HEMOGLOBIN A1C; Future  - VITAMIN D,25 HYDROXY (DEFICIENCY); Future      Health Maintenance: Completed    ROS:  ROS    Review of systems unremarkable except for concerns noted by patient or items listed.    Please see HPI for additional ROS.      Objective:     Exam:  BP 90/60 (BP Location: Left arm, Patient Position: Sitting, BP Cuff Size: Adult)   Pulse 82   Temp 36.2 °C (97.2 °F) (Temporal)   Ht 1.702 m (5' 7\")   Wt 114 kg (252 lb 3.2 oz)   LMP 04/30/2024   SpO2 97%   BMI 39.50 kg/m²  Body mass index is 39.5 kg/m².    Physical Exam  Constitutional:       Appearance: Normal appearance.   HENT:      Head: Normocephalic.   Eyes:      General: No scleral icterus.  Cardiovascular:      Rate and Rhythm: Normal rate and regular rhythm.      Pulses: Normal pulses.      Heart sounds: Normal heart sounds.   Pulmonary:      Effort: Pulmonary effort is normal.      Breath sounds: Normal breath sounds.   Musculoskeletal:      Right lower leg: No edema.      Left lower leg: No edema.   Skin:     General: Skin is warm.   Neurological:      Mental Status: She is alert and oriented to person, place, and time.   Psychiatric:         Mood and Affect: Mood normal.         Behavior: Behavior normal.             Labs: reviewed         Return in about 3 months (around 8/9/2024).    Please note that this dictation was created using voice recognition software. I have made every reasonable attempt to correct obvious errors, but I expect that there are errors of grammar and possibly content that I did not discover before finalizing the note.        "

## 2024-05-10 ENCOUNTER — HOSPITAL ENCOUNTER (OUTPATIENT)
Dept: LAB | Facility: MEDICAL CENTER | Age: 39
End: 2024-05-10
Attending: STUDENT IN AN ORGANIZED HEALTH CARE EDUCATION/TRAINING PROGRAM
Payer: COMMERCIAL

## 2024-05-10 DIAGNOSIS — Z00.00 HEALTHCARE MAINTENANCE: ICD-10-CM

## 2024-05-10 DIAGNOSIS — E55.9 VITAMIN D DEFICIENCY: ICD-10-CM

## 2024-05-10 LAB
25(OH)D3 SERPL-MCNC: 14 NG/ML (ref 30–100)
ALBUMIN SERPL BCP-MCNC: 4.5 G/DL (ref 3.2–4.9)
ALBUMIN/GLOB SERPL: 1.4 G/DL
ALP SERPL-CCNC: 63 U/L (ref 30–99)
ALT SERPL-CCNC: 24 U/L (ref 2–50)
ANION GAP SERPL CALC-SCNC: 10 MMOL/L (ref 7–16)
AST SERPL-CCNC: 21 U/L (ref 12–45)
BILIRUB SERPL-MCNC: 0.2 MG/DL (ref 0.1–1.5)
BUN SERPL-MCNC: 11 MG/DL (ref 8–22)
CALCIUM ALBUM COR SERPL-MCNC: 8.5 MG/DL (ref 8.5–10.5)
CALCIUM SERPL-MCNC: 8.9 MG/DL (ref 8.5–10.5)
CHLORIDE SERPL-SCNC: 102 MMOL/L (ref 96–112)
CHOLEST SERPL-MCNC: 141 MG/DL (ref 100–199)
CO2 SERPL-SCNC: 24 MMOL/L (ref 20–33)
CREAT SERPL-MCNC: 0.99 MG/DL (ref 0.5–1.4)
ERYTHROCYTE [DISTWIDTH] IN BLOOD BY AUTOMATED COUNT: 46.2 FL (ref 35.9–50)
EST. AVERAGE GLUCOSE BLD GHB EST-MCNC: 120 MG/DL
FASTING STATUS PATIENT QL REPORTED: NORMAL
GFR SERPLBLD CREATININE-BSD FMLA CKD-EPI: 75 ML/MIN/1.73 M 2
GLOBULIN SER CALC-MCNC: 3.3 G/DL (ref 1.9–3.5)
GLUCOSE SERPL-MCNC: 89 MG/DL (ref 65–99)
HBA1C MFR BLD: 5.8 % (ref 4–5.6)
HCT VFR BLD AUTO: 43.5 % (ref 37–47)
HDLC SERPL-MCNC: 47 MG/DL
HGB BLD-MCNC: 13.9 G/DL (ref 12–16)
LDLC SERPL CALC-MCNC: 86 MG/DL
MCH RBC QN AUTO: 26.8 PG (ref 27–33)
MCHC RBC AUTO-ENTMCNC: 32 G/DL (ref 32.2–35.5)
MCV RBC AUTO: 84 FL (ref 81.4–97.8)
PLATELET # BLD AUTO: 301 K/UL (ref 164–446)
PMV BLD AUTO: 9.1 FL (ref 9–12.9)
POTASSIUM SERPL-SCNC: 5.1 MMOL/L (ref 3.6–5.5)
PROT SERPL-MCNC: 7.8 G/DL (ref 6–8.2)
RBC # BLD AUTO: 5.18 M/UL (ref 4.2–5.4)
SODIUM SERPL-SCNC: 136 MMOL/L (ref 135–145)
TRIGL SERPL-MCNC: 39 MG/DL (ref 0–149)
TSH SERPL DL<=0.005 MIU/L-ACNC: 1.09 UIU/ML (ref 0.38–5.33)
WBC # BLD AUTO: 4.5 K/UL (ref 4.8–10.8)

## 2024-05-19 RX ORDER — ERGOCALCIFEROL 1.25 MG/1
50000 CAPSULE ORAL
Qty: 12 CAPSULE | Refills: 0 | Status: SHIPPED | OUTPATIENT
Start: 2024-05-19

## 2024-06-28 ENCOUNTER — TELEPHONE (OUTPATIENT)
Dept: HEALTH INFORMATION MANAGEMENT | Facility: OTHER | Age: 39
End: 2024-06-28
Payer: COMMERCIAL

## 2024-07-03 ENCOUNTER — OFFICE VISIT (OUTPATIENT)
Dept: SLEEP MEDICINE | Facility: MEDICAL CENTER | Age: 39
End: 2024-07-03
Attending: STUDENT IN AN ORGANIZED HEALTH CARE EDUCATION/TRAINING PROGRAM
Payer: COMMERCIAL

## 2024-07-03 VITALS
HEIGHT: 67 IN | DIASTOLIC BLOOD PRESSURE: 66 MMHG | HEART RATE: 80 BPM | BODY MASS INDEX: 39.71 KG/M2 | WEIGHT: 253 LBS | SYSTOLIC BLOOD PRESSURE: 102 MMHG | OXYGEN SATURATION: 97 %

## 2024-07-03 DIAGNOSIS — F51.04 CHRONIC INSOMNIA: ICD-10-CM

## 2024-07-03 DIAGNOSIS — E66.01 CLASS 2 SEVERE OBESITY DUE TO EXCESS CALORIES WITH SERIOUS COMORBIDITY AND BODY MASS INDEX (BMI) OF 39.0 TO 39.9 IN ADULT (HCC): ICD-10-CM

## 2024-07-03 DIAGNOSIS — R06.81 WITNESSED APNEIC SPELLS: ICD-10-CM

## 2024-07-03 DIAGNOSIS — G47.30 SLEEP DISORDER BREATHING: Primary | ICD-10-CM

## 2024-07-03 DIAGNOSIS — R06.89 GASPING FOR BREATH: ICD-10-CM

## 2024-07-03 PROCEDURE — 99204 OFFICE O/P NEW MOD 45 MIN: CPT | Performed by: STUDENT IN AN ORGANIZED HEALTH CARE EDUCATION/TRAINING PROGRAM

## 2024-07-03 PROCEDURE — 99212 OFFICE O/P EST SF 10 MIN: CPT | Performed by: STUDENT IN AN ORGANIZED HEALTH CARE EDUCATION/TRAINING PROGRAM

## 2024-07-03 PROCEDURE — 3074F SYST BP LT 130 MM HG: CPT | Performed by: STUDENT IN AN ORGANIZED HEALTH CARE EDUCATION/TRAINING PROGRAM

## 2024-07-03 PROCEDURE — 3078F DIAST BP <80 MM HG: CPT | Performed by: STUDENT IN AN ORGANIZED HEALTH CARE EDUCATION/TRAINING PROGRAM

## 2024-07-03 ASSESSMENT — FIBROSIS 4 INDEX: FIB4 SCORE: 0.54

## 2024-08-02 DIAGNOSIS — K21.9 GASTROESOPHAGEAL REFLUX DISEASE, UNSPECIFIED WHETHER ESOPHAGITIS PRESENT: ICD-10-CM

## 2024-08-16 ENCOUNTER — OFFICE VISIT (OUTPATIENT)
Dept: MEDICAL GROUP | Facility: MEDICAL CENTER | Age: 39
End: 2024-08-16
Payer: COMMERCIAL

## 2024-08-16 VITALS
HEIGHT: 67 IN | OXYGEN SATURATION: 97 % | SYSTOLIC BLOOD PRESSURE: 96 MMHG | RESPIRATION RATE: 18 BRPM | BODY MASS INDEX: 40.19 KG/M2 | WEIGHT: 256.1 LBS | DIASTOLIC BLOOD PRESSURE: 66 MMHG | TEMPERATURE: 97.8 F | HEART RATE: 82 BPM

## 2024-08-16 DIAGNOSIS — F41.1 GENERALIZED ANXIETY DISORDER: ICD-10-CM

## 2024-08-16 DIAGNOSIS — Z00.00 WELLNESS EXAMINATION: Primary | ICD-10-CM

## 2024-08-16 DIAGNOSIS — E55.9 VITAMIN D DEFICIENCY: ICD-10-CM

## 2024-08-16 DIAGNOSIS — Z12.4 SCREENING FOR CERVICAL CANCER: ICD-10-CM

## 2024-08-16 DIAGNOSIS — R73.03 PREDIABETES: ICD-10-CM

## 2024-08-16 DIAGNOSIS — F33.1 MODERATE EPISODE OF RECURRENT MAJOR DEPRESSIVE DISORDER (HCC): ICD-10-CM

## 2024-08-16 PROCEDURE — 99395 PREV VISIT EST AGE 18-39: CPT | Performed by: STUDENT IN AN ORGANIZED HEALTH CARE EDUCATION/TRAINING PROGRAM

## 2024-08-16 PROCEDURE — 99214 OFFICE O/P EST MOD 30 MIN: CPT | Mod: 25 | Performed by: STUDENT IN AN ORGANIZED HEALTH CARE EDUCATION/TRAINING PROGRAM

## 2024-08-16 PROCEDURE — 3074F SYST BP LT 130 MM HG: CPT | Performed by: STUDENT IN AN ORGANIZED HEALTH CARE EDUCATION/TRAINING PROGRAM

## 2024-08-16 PROCEDURE — 3078F DIAST BP <80 MM HG: CPT | Performed by: STUDENT IN AN ORGANIZED HEALTH CARE EDUCATION/TRAINING PROGRAM

## 2024-08-16 RX ORDER — ERGOCALCIFEROL 1.25 MG/1
50000 CAPSULE ORAL
Qty: 12 CAPSULE | Refills: 0 | Status: SHIPPED | OUTPATIENT
Start: 2024-08-16

## 2024-08-16 RX ORDER — ALPRAZOLAM 0.5 MG
0.5 TABLET ORAL
Qty: 2 TABLET | Refills: 0 | Status: SHIPPED | OUTPATIENT
Start: 2024-08-16 | End: 2024-08-18

## 2024-08-16 ASSESSMENT — ENCOUNTER SYMPTOMS
SHORTNESS OF BREATH: 0
BLOOD IN STOOL: 0
HALLUCINATIONS: 0
STRIDOR: 0
DIAPHORESIS: 0
SEIZURES: 0
EYE DISCHARGE: 0
CHILLS: 0
PALPITATIONS: 0
SINUS PAIN: 0
FOCAL WEAKNESS: 0
FEVER: 0
SPEECH CHANGE: 0
FLANK PAIN: 0
BLURRED VISION: 0
EYE PAIN: 0
EYE REDNESS: 0
WHEEZING: 0
ABDOMINAL PAIN: 0
LOSS OF CONSCIOUSNESS: 0

## 2024-08-16 ASSESSMENT — FIBROSIS 4 INDEX: FIB4 SCORE: 0.54

## 2024-08-16 ASSESSMENT — LIFESTYLE VARIABLES: SUBSTANCE_ABUSE: 0

## 2024-08-16 NOTE — PROGRESS NOTES
Subjective:     CC:   Chief Complaint   Patient presents with    Follow-Up    Annual Exam         HPI:   Allison presents today with      Verbal consent was acquired by the patient to use [employer] ambient listening note generation during this visit Yes   History of Present Illness  The patient is a 39-year-old female who presents for labs follow-up and annual physical    Patient is up-to-date with all of her vaccination except for hepatitis B.  Wants to get it done later.  She is due for Pap smear.  Denies having any Pap smear for many years due to anxiety related to the procedure.  Normal menstrual cycles    She has been diagnosed with vitamin D deficiency and prediabetes. She completed her prescribed vitamin D course last Friday. She has made dietary changes, including increasing protein and fiber intake, and reducing carbohydrates, sugars, and sodium. She admits to not drinking enough water, consuming only about 16 ounces daily. She reports no dizziness.    She has never undergone a Pap smear due to extreme anxiety. She has requested a referral to a gynecologist and a prescription for Xanax to manage her anxiety during the procedure.    She maintains good oral hygiene with regular dental cleanings and uses sunscreen when outdoors. She snores but reports no swelling, redness, or pain. She also reports no constipation or bowel issues.    Her mood has been better. She plans to see about getting back in with the therapist. She wanted to set up a follow-up in about 3 months because the last time, when everything changed and got a little crazy and stressful around the 3-month mackenzie, therapy alone was not effective, and she was started on Paxil. She has a sleep study scheduled for the 22nd of this month.    Results  Laboratory Studies  Vitamin D level was 14. Prediabetes was borderline.     Low vitamin D at 14  A1c 5.8-prediabetic    Normal lipid panel, chemistry panel, hemoglobin and hematocrit levels normal  "TSH    Health Maintenance: Completed    ROS:  Review of Systems   Constitutional:  Negative for chills, diaphoresis and fever.   HENT:  Negative for congestion, ear pain and sinus pain.    Eyes:  Negative for blurred vision, pain, discharge and redness.   Respiratory:  Negative for shortness of breath, wheezing and stridor.    Cardiovascular:  Negative for chest pain and palpitations.   Gastrointestinal:  Negative for abdominal pain and blood in stool.   Genitourinary:  Negative for flank pain and hematuria.   Neurological:  Negative for speech change, focal weakness, seizures and loss of consciousness.   Psychiatric/Behavioral:  Negative for hallucinations, substance abuse and suicidal ideas.        Review of systems unremarkable except for concerns noted by patient or items listed.    Please see HPI for additional ROS.      Objective:     Exam:  BP 96/66 (BP Location: Right arm, Patient Position: Sitting, BP Cuff Size: Adult)   Pulse 82   Temp 36.6 °C (97.8 °F) (Temporal)   Resp 18   Ht 1.702 m (5' 7\")   Wt 116 kg (256 lb 1.6 oz)   SpO2 97%   BMI 40.11 kg/m²  Body mass index is 40.11 kg/m².    Physical Exam  Constitutional:       Appearance: Normal appearance.   HENT:      Head: Normocephalic.      Right Ear: Tympanic membrane, ear canal and external ear normal. There is no impacted cerumen.      Left Ear: Tympanic membrane, ear canal and external ear normal. There is no impacted cerumen.      Nose: Nose normal. No congestion or rhinorrhea.      Mouth/Throat:      Mouth: Mucous membranes are moist.      Pharynx: Oropharynx is clear. No oropharyngeal exudate or posterior oropharyngeal erythema.   Eyes:      General: No scleral icterus.     Extraocular Movements: Extraocular movements intact.      Conjunctiva/sclera: Conjunctivae normal.      Pupils: Pupils are equal, round, and reactive to light.   Cardiovascular:      Rate and Rhythm: Normal rate and regular rhythm.      Pulses: Normal pulses.      Heart " sounds: Normal heart sounds.   Pulmonary:      Effort: Pulmonary effort is normal.      Breath sounds: Normal breath sounds.   Abdominal:      General: Bowel sounds are normal.      Palpations: Abdomen is soft.   Musculoskeletal:         General: No swelling. Normal range of motion.      Cervical back: Normal range of motion and neck supple.      Right lower leg: No edema.      Left lower leg: No edema.   Skin:     General: Skin is warm.      Capillary Refill: Capillary refill takes less than 2 seconds.      Findings: No lesion.   Neurological:      General: No focal deficit present.      Mental Status: She is alert and oriented to person, place, and time.      Cranial Nerves: No cranial nerve deficit.      Sensory: No sensory deficit.      Motor: No weakness.   Psychiatric:         Mood and Affect: Mood normal.         Behavior: Behavior normal.             Labs: reviewed     Assessment & Plan:     39 y.o. female with the following -         1.Wellness examination     Discussed:  regular exercise   healthy diet  Sun protection w SPF  Biannual dentist visit   Substance Abuse: Declined  Safe in relationship. Yes  Seat belts, bike helmet, gun safety discussed.  Last lab results were reviewed by me today      2. Moderate episode of recurrent major depressive disorder (HCC)  This is a chronic problem stable  No SI or HI .  Patient on Paxil 40 mg daily for a long time  Doing much better    Restarted Following up with psychologist for therapy for few months but recently due to scheduling and lack of time issues - was unable to follow.  Plan:  Encouraged patient to continue following up with psychology or therapist  Continue Paxil 40 mg daily  Continue relaxation techniques    3. Generalized anxiety disorder  Chronic, stable  Plan  Continue Paxil 40 mg daily  Continue following up with therapist    - ALPRAZolam (XANAX) 0.5 MG Tab; Take 1 Tablet by mouth 1 time a day as needed for Anxiety for up to 2 days.  Dispense: 2  Tablet; Refill: 0    4. Screening for cervical cancer  Due for pap smear   Patient has anxiety regarding Pap smears in the past she had difficulty.  She wants to get the Pap test done with a gynecologist.  She is also reporting anxiety due to Paps.  I will give prescription for a dose of Xanax prior to her Pap smear.  I have also recommended patient to discuss this with her gynecologist for her approval    - ALPRAZolam (XANAX) 0.5 MG Tab; Take 1 Tablet by mouth 1 time a day as needed for Anxiety for up to 2 days.  Dispense: 2 Tablet; Refill: 0  - Referral to Gynecology    5. Vitamin D deficiency  Chronic  Labs show vitamin D of 14  Plan    - ergocalciferol (DRISDOL) 53087 UNIT capsule; Take 1 Capsule by mouth every 7 days.  Dispense: 12 Capsule; Refill: 0    6. Prediabetes   New problem   Stable plan:  Recommended low carb diet and exercise   Recommended weight loss     Return in about 3 months (around 11/16/2024) for chronic problems.    Please note that this dictation was created using voice recognition software. I have made every reasonable attempt to correct obvious errors, but I expect that there are errors of grammar and possibly content that I did not discover before finalizing the note.

## 2024-08-18 ENCOUNTER — TELEPHONE (OUTPATIENT)
Dept: SLEEP MEDICINE | Facility: MEDICAL CENTER | Age: 39
End: 2024-08-18
Payer: COMMERCIAL

## 2024-08-22 ENCOUNTER — HOME STUDY (OUTPATIENT)
Dept: SLEEP MEDICINE | Facility: MEDICAL CENTER | Age: 39
End: 2024-08-22
Attending: STUDENT IN AN ORGANIZED HEALTH CARE EDUCATION/TRAINING PROGRAM
Payer: COMMERCIAL

## 2024-08-22 DIAGNOSIS — R06.81 WITNESSED APNEIC SPELLS: ICD-10-CM

## 2024-08-22 DIAGNOSIS — G47.30 SLEEP DISORDER BREATHING: ICD-10-CM

## 2024-08-22 DIAGNOSIS — E66.01 CLASS 2 SEVERE OBESITY DUE TO EXCESS CALORIES WITH SERIOUS COMORBIDITY AND BODY MASS INDEX (BMI) OF 39.0 TO 39.9 IN ADULT (HCC): ICD-10-CM

## 2024-08-22 DIAGNOSIS — R06.89 GASPING FOR BREATH: ICD-10-CM

## 2024-08-22 PROCEDURE — 95800 SLP STDY UNATTENDED: CPT | Performed by: STUDENT IN AN ORGANIZED HEALTH CARE EDUCATION/TRAINING PROGRAM

## 2024-08-29 NOTE — PROCEDURES
DIAGNOSTIC HOME SLEEP TEST (HST) REPORT WatchPAT      PATIENT ID:  NAME:  Allison Collins  MRN:               [id number]  YOB: 1985  DATE OF STUDY: 8/22/2024      Impression:     This study shows evidence of:      1. Severe obstructive sleep apnea with PAT apnea hypopnea index(pAHI) of 42.3 per hour.  PAT respiratory disturbance index (pRDI) was 42.8 per hour. These findings are based on 7 channels recording of PAT signal with sleep staging, heart rate, pulse oximetry, actigraphy, body position, snoring and respiratory movement.     2. Oxygenation O2 Sat. mean O2 sat was 92%,  debby was 76%,  and maximum O2 at 99 %. O2 sat was at or  below 88% for 4.1 min of evaluation time. Oxygen Desaturation (>=4%) Index was 29.9/hr. AVG HR was 76 BPM.      TECHNICAL DESCRIPTION: Patient underwent home sleep apnea testing with peripheral arterial tone signal (WatchPAT™). This is a Type IV portable monitor and device per Medicare. Monitoring was done with 7 channels recording of PAT signal with sleep staging, heart rate, pulse oximetry, actigraphy, body position, snoring and respiratory movement. Prior to using the device, the patient received verbal and written instructions for its application and was provided with the help desk phone number for additional telephonic instruction with 24-hour availability of qualified personnel to answer questions.    Respiratory events:      General sleep summary: . Total recording time is 8 hours and 31 minutes and total Sleep time is 6 hours and 27 minutes. The patient spent 368 minutes in the supine position and 20 minutes in the nonsupine position.      Recommendations:    1. CPAP titration study vs Auto CPAP trial.   2. In general patients with sleep apnea are advised to avoid alcohol and sedatives and to not operate a motor vehicle while drowsy. In some cases alternative treatment options may prove effective in resolving sleep apnea in these options include upper  airway surgery, the use of a dental orthotic or weight loss and positional therapy. Clinical correlation is required.         Tomy Dillon MD

## 2024-09-20 ENCOUNTER — OFFICE VISIT (OUTPATIENT)
Dept: SLEEP MEDICINE | Facility: MEDICAL CENTER | Age: 39
End: 2024-09-20
Attending: NURSE PRACTITIONER
Payer: COMMERCIAL

## 2024-09-20 VITALS
OXYGEN SATURATION: 97 % | BODY MASS INDEX: 40.18 KG/M2 | HEIGHT: 67 IN | HEART RATE: 80 BPM | WEIGHT: 256 LBS | RESPIRATION RATE: 16 BRPM | DIASTOLIC BLOOD PRESSURE: 64 MMHG | SYSTOLIC BLOOD PRESSURE: 108 MMHG

## 2024-09-20 DIAGNOSIS — G47.33 OSA (OBSTRUCTIVE SLEEP APNEA): ICD-10-CM

## 2024-09-20 PROCEDURE — 99213 OFFICE O/P EST LOW 20 MIN: CPT | Performed by: NURSE PRACTITIONER

## 2024-09-20 PROCEDURE — 99214 OFFICE O/P EST MOD 30 MIN: CPT | Performed by: NURSE PRACTITIONER

## 2024-09-20 PROCEDURE — 3078F DIAST BP <80 MM HG: CPT | Performed by: NURSE PRACTITIONER

## 2024-09-20 PROCEDURE — 3074F SYST BP LT 130 MM HG: CPT | Performed by: NURSE PRACTITIONER

## 2024-09-20 ASSESSMENT — FIBROSIS 4 INDEX: FIB4 SCORE: 0.54

## 2024-09-20 NOTE — PROGRESS NOTES
Chief Complaint   Patient presents with    Follow-Up     SLEEP - RESULTS - CHART PREP COMPLETED ON 09/11/2024    Last Office Visit 07/03/2025 with Dr. Dillon    Sleep Study Complete on 08/22/2024     OPO Completed on N/A Pressures Completed on N/A    Raw Data in Media? YES  , If raw data is missing has sleep tech been notified? N/A    Interp Completed? YES  , If pending has provider been notified to complete? N/A        HPI:  Alilson Collins is a 39 y.o. year old female here today for follow-up on sleep study results.  Last seen by Dr. Dillon on 7/3/2025. She has been told that she snores for years.  However she has noticed within the past year or so that she has been having more episodes of gasping in her sleep especially on her back.  In addition her wife reports that she does stop breathing at times in her sleep.  She feels that her sleep quality has diminished.  She does not feel rested from sleep.  This can lead to having daytime fatigue at times.  She has experienced rare episodes of drowsiness while driving on her way to work in the mornings.     She does report difficulty with sleep initiation.  He generally takes her 1 to 1-1/2 hours to get to sleep.  When she is asleep she generally wakes up 2-3 times a night but does not have difficulty getting back to sleep.  Her trouble with sleep initiation has been having for years.  She does feel it impacts her as she would like to go to get more sleep at night to feel more rested.      HST (8/22/2024):  1. Severe obstructive sleep apnea with PAT apnea hypopnea index(pAHI) of 42.3 per hour.  PAT respiratory disturbance index (pRDI) was 42.8 per hour. These findings are based on 7 channels recording of PAT signal with sleep staging, heart rate, pulse oximetry, actigraphy, body position, snoring and respiratory movement.      2. Oxygenation O2 Sat. mean O2 sat was 92%,  debby was 76%,  and maximum O2 at 99 %. O2 sat was at or  below 88% for 4.1 min of evaluation  "time. Oxygen Desaturation (>=4%) Index was 29.9/hr. AVG HR was 76 BPM.    ROS: As per HPI and otherwise negative if not stated.    Past Medical History:   Diagnosis Date    Allergic rhinitis 5/13/2020       Past Surgical History:   Procedure Laterality Date    DENTAL EXTRACTION(S)      wisdom teeth extraction       Family History   Problem Relation Age of Onset    Hypertension Mother     Sleep Apnea Brother     Cancer Maternal Grandmother         breast cancer at 88    Hypertension Maternal Grandmother     Diabetes Maternal Grandmother        Allergies as of 09/20/2024    (No Active Allergies)        Vitals:  /64 (BP Location: Left arm, Patient Position: Sitting, BP Cuff Size: Adult)   Pulse 80   Resp 16   Ht 1.702 m (5' 7\")   Wt 116 kg (256 lb)   SpO2 97%     Current medications as of today   Current Outpatient Medications   Medication Sig Dispense Refill    ergocalciferol (DRISDOL) 20657 UNIT capsule Take 1 Capsule by mouth every 7 days. 12 Capsule 0    omeprazole (PRILOSEC) 20 MG delayed-release capsule TAKE 1 CAPSULE BY MOUTH EVERY DAY 90 Capsule 1    paroxetine (PAXIL) 40 MG tablet Take 1 Tablet by mouth every day. 90 Tablet 3    XOLAIR 150 MG/ML Solution Prefilled Syringe       famotidine (PEPCID AC) 10 MG tablet Take 1 Tablet by mouth 2 times a day. 60 Tablet 1    EPINEPHrine (EPIPEN) 0.3 MG/0.3ML Solution Auto-injector solution for injection 0.3 mg by Intramuscular route Once PRN.      cetirizine (ZYRTEC) 10 MG Tab Take 10 mg by mouth every day.      diphenhydrAMINE (BENADRYL) 25 MG Tab Take 25 mg by mouth every 6 hours as needed for Sleep.      methylPREDNISolone (MEDROL DOSEPAK) 4 MG Tablet Therapy Pack Take as directed on package.  Dispense one package. (Patient not taking: Reported on 5/9/2024) 1 Each 0    ondansetron (ZOFRAN ODT) 4 MG TABLET DISPERSIBLE Take 1 Tablet by mouth every 6 hours as needed for Nausea/Vomiting. (Patient not taking: Reported on 8/16/2024) 10 Tablet 0    montelukast " (SINGULAIR) 10 MG Tab Take 10 mg by mouth. (Patient not taking: Reported on 8/16/2024)       No current facility-administered medications for this visit.         Physical Exam:   Gen:           Alert and oriented, No apparent distress. Mood and affect appropriate, normal interaction with examiner.  Eyes:          PERRL, EOM intact, sclere white, conjunctive moist.  Ears:          Not examined.   Hearing:     Grossly intact.  Nose:          Normal, no lesions or deformities.  Dentition:    Good dentition.  Oropharynx:   Tongue normal, posterior pharynx without erythema or exudate.  Neck:        Supple, trachea midline, no masses.  Respiratory Effort: No intercostal retractions or use of accessory muscles.   Lung Auscultation:      Clear to auscultation bilaterally; no rales, rhonchi or wheezing.  CV:            Regular rate and rhythm. No murmurs, rubs or gallops.  Abd:           Not examined.   Lymphadenopathy: Not examined.  Gait and Station: Normal.  Digits and Nails: No clubbing, cyanosis, petechiae, or nodes.   Cranial Nerves: II-XII grossly intact.  Skin:        No rashes, lesions or ulcers noted.               Ext:           No cyanosis or edema.      Assessment:  1. KATY (obstructive sleep apnea)            Plan:   I reviewed with the patient the pathophysiology of obstructive sleep apnea, as well as potential cardiac and neurologic risks associated with untreated sleep apnea including CAD, HTN, pulmonary arterial hypertension, cardiac arrhythmias, heart attack or stroke.  KATY patient's have increased risk of motor vehicle accidents, DM type II, chronic kidney disease and nonalcoholic liver disease.  He is cautioned against driving while sleepy for his safety and safety of others on the road. We reviewed treatment modalities for sleep apnea including CPAP/BiPAP therapy, ENT referral, dental appliance.      Home sleep study showed evidence of severe KATY which worsened in rem sleep.  Patient is amendable to  "trial on Auto CPAP.  She should benefit from positive airway pressure.  Order placed to the following DME company.  Recommended follow-up within 90 days on new device.      DME : [facility]    Once you receive your new PAP machine from the [employer] company you're referred to, we must see you back for an office visit between 30-90 days of you using the machine to review compliance.  If you were ordered an ASV machine, we need to see you in office no sooner than your 60th day on therapy.     This is a very important time frame for insurance purposes. If you do not follow up with our office for compliance your insurance may not continue to pay for the machine. Also if you do not use the machine for at least 4 hours each night, you may be deemed \"Incompliant\", in which case the insurance may also not continue to pay for the machine.    If you are incompliant, you may have to surrender your machine to the DME company and start the process over if you wish to continue therapy after that. Meaning a new office consult and new sleep studies.    For your first visit back with our office, please bring the whole machine with the power cord. We will download the compliance off the SD card in the machine, and are able to make changes if need be in office. Some machines have a modem and we can access the data wirelessly, if this is the case please make sure the DME company grants us access to your machine.  For all follow up appointments after that, you will only need to bring the SD card to the appointment.    If you are having any issues with the mask, you have a 30 day window to exchange and try something else with your DME company.  If you are having issues with the pressure, please call our office at [phone].  These issues can cause you to not be able to use machine appropriately, there for make you incompliant.    Please call our office if you have any questions.    Thank you, [facility] " [place].  [phone]      Please note that this dictation was created using voice recognition software. I have made every reasonable attempt to correct obvious errors, but it is possible there are errors of grammar and possibly content that I did not discover before finalizing the note.

## 2024-10-17 ENCOUNTER — APPOINTMENT (OUTPATIENT)
Dept: OBGYN | Facility: CLINIC | Age: 39
End: 2024-10-17
Payer: COMMERCIAL

## 2024-11-21 ENCOUNTER — OFFICE VISIT (OUTPATIENT)
Dept: MEDICAL GROUP | Facility: MEDICAL CENTER | Age: 39
End: 2024-11-21
Payer: COMMERCIAL

## 2024-11-21 VITALS
HEART RATE: 78 BPM | HEIGHT: 67 IN | DIASTOLIC BLOOD PRESSURE: 64 MMHG | BODY MASS INDEX: 40.7 KG/M2 | SYSTOLIC BLOOD PRESSURE: 108 MMHG | OXYGEN SATURATION: 96 % | TEMPERATURE: 96.8 F | WEIGHT: 259.3 LBS

## 2024-11-21 DIAGNOSIS — F41.1 GENERALIZED ANXIETY DISORDER: ICD-10-CM

## 2024-11-21 DIAGNOSIS — G47.33 OSA (OBSTRUCTIVE SLEEP APNEA): ICD-10-CM

## 2024-11-21 DIAGNOSIS — R73.03 PREDIABETES: ICD-10-CM

## 2024-11-21 DIAGNOSIS — E66.01 OBESITY, CLASS III, BMI 40-49.9 (MORBID OBESITY) (HCC): ICD-10-CM

## 2024-11-21 DIAGNOSIS — Z23 NEED FOR VACCINATION: ICD-10-CM

## 2024-11-21 DIAGNOSIS — E55.9 VITAMIN D DEFICIENCY: ICD-10-CM

## 2024-11-21 PROBLEM — E66.813 OBESITY, CLASS III, BMI 40-49.9 (MORBID OBESITY): Status: ACTIVE | Noted: 2020-05-13

## 2024-11-21 PROCEDURE — 3078F DIAST BP <80 MM HG: CPT | Performed by: STUDENT IN AN ORGANIZED HEALTH CARE EDUCATION/TRAINING PROGRAM

## 2024-11-21 PROCEDURE — 3074F SYST BP LT 130 MM HG: CPT | Performed by: STUDENT IN AN ORGANIZED HEALTH CARE EDUCATION/TRAINING PROGRAM

## 2024-11-21 PROCEDURE — 90471 IMMUNIZATION ADMIN: CPT | Performed by: STUDENT IN AN ORGANIZED HEALTH CARE EDUCATION/TRAINING PROGRAM

## 2024-11-21 PROCEDURE — 99214 OFFICE O/P EST MOD 30 MIN: CPT | Mod: 25 | Performed by: STUDENT IN AN ORGANIZED HEALTH CARE EDUCATION/TRAINING PROGRAM

## 2024-11-21 PROCEDURE — 90656 IIV3 VACC NO PRSV 0.5 ML IM: CPT | Performed by: STUDENT IN AN ORGANIZED HEALTH CARE EDUCATION/TRAINING PROGRAM

## 2024-11-21 RX ORDER — ERGOCALCIFEROL 1.25 MG/1
50000 CAPSULE ORAL
Qty: 12 CAPSULE | Refills: 0 | Status: SHIPPED | OUTPATIENT
Start: 2024-11-21

## 2024-11-21 RX ORDER — PHENTERMINE HYDROCHLORIDE 37.5 MG/1
18.75 TABLET ORAL
Qty: 30 TABLET | Refills: 0 | Status: SHIPPED | OUTPATIENT
Start: 2024-11-21 | End: 2025-01-20

## 2024-11-21 ASSESSMENT — FIBROSIS 4 INDEX: FIB4 SCORE: 0.54

## 2024-11-24 PROBLEM — G47.33 OSA (OBSTRUCTIVE SLEEP APNEA): Status: ACTIVE | Noted: 2024-11-24

## 2024-11-24 PROBLEM — R73.03 PREDIABETES: Status: ACTIVE | Noted: 2024-11-24

## 2024-11-25 NOTE — ASSESSMENT & PLAN NOTE
The patient has prediabetes 5.8%   The possibility of using injectable medications for weight loss was discussed, but they are not covered for prediabetes by her insurance. The patient was advised to continue monitoring her diet and exercise.  Low carb diet recommended

## 2024-11-25 NOTE — ASSESSMENT & PLAN NOTE
Chronic, uncontrolled   The patient has struggled with diet and exercise due to time constraints. She has increased her protein intake but has not significantly reduced carbohydrates or tracked calories. Phentermine was prescribed, with instructions to take half a dose for the first month. If there is no weight loss after a month, the dosage may be increased. Potential side effects, including agitation, restlessness, jitteriness, palpitations, and trouble sleeping, were discussed. She was advised to take the medication in the morning to avoid sleep disturbances. The importance of calorie counting and the role of diet in weight loss were emphasized. If she does not tolerate phentermine or sees limited benefits after six months, alternative treatments may be considered.  Controlled substance discussed with client. Client agrees to abide by controlled substance contract.  PDMP reviewed . shows no abnormal prescribing or filling behavior.    The treatment plan was reviewed and discussed with the patient. The pharmacy monitoring report was requested and reviewed.  Patient is appropriate for refill of this medication.  -Patient informed no medication adjustments will be made to titrate up or add additional narcotics, benzodiazepines or other controlled substances to this regimen.  Referral to pain management or behavioral health will be made as appropriate.    Orders:    phentermine (ADIPEX-P) 37.5 MG tablet; Take 0.5 Tablets by mouth every morning before breakfast for 60 days.    Controlled Substance Treatment Agreement

## 2024-11-25 NOTE — ASSESSMENT & PLAN NOTE
Chronic, stable  The patient reports that her anxiety has not worsened and remains stable. She is currently managing her anxiety without additional medication changes.  Continue Paxil 40 gm daily

## 2024-11-25 NOTE — PROGRESS NOTES
"Subjective:     CC:   Chief Complaint   Patient presents with    Follow-Up     Vitamin B levels and medications        HPI:   Allison presents today with  Verbal consent was acquired by the patient to use [employer] ambient listening note generation during this visit Yes   History of Present Illness  The patient is here for a follow-up.    She reports an improvement in  anxiety levels, which have remained stable. However, she has been struggling with maintaining a healthy diet and regular exercise. Despite her intentions to incorporate more physical activity into his routine, she has found it challenging to find the time. she has made some dietary changes, such as increasing  protein intake, but admits to not reducing his carbohydrate consumption. she also mentions that she snores, but this has improved since she started using a CPAP machine.         Health Maintenance: Completed    ROS:  ROS    Review of systems unremarkable except for concerns noted by patient or items listed.    Please see HPI for additional ROS.      Objective:     Exam:  /64 (BP Location: Left arm, Patient Position: Sitting, BP Cuff Size: Adult)   Pulse 78   Temp 36 °C (96.8 °F) (Temporal)   Ht 1.702 m (5' 7\")   Wt 118 kg (259 lb 4.8 oz)   LMP 11/07/2024   SpO2 96%   BMI 40.61 kg/m²  Body mass index is 40.61 kg/m².    Physical Exam  Constitutional:       Appearance: Normal appearance.   HENT:      Head: Normocephalic.   Eyes:      General: No scleral icterus.  Cardiovascular:      Rate and Rhythm: Normal rate and regular rhythm.      Pulses: Normal pulses.      Heart sounds: Normal heart sounds.   Pulmonary:      Effort: Pulmonary effort is normal.      Breath sounds: Normal breath sounds.   Musculoskeletal:      Right lower leg: No edema.      Left lower leg: No edema.   Skin:     General: Skin is warm.   Neurological:      Mental Status: She is alert and oriented to person, place, and time.   Psychiatric:         Mood and " Affect: Mood normal.         Behavior: Behavior normal.             Labs: reviewed     Assessment & Plan:     39 y.o. female with the following -     Assessment & Plan  Obesity, Class III, BMI 40-49.9 (morbid obesity) (HCC)  Chronic, uncontrolled   The patient has struggled with diet and exercise due to time constraints. She has increased her protein intake but has not significantly reduced carbohydrates or tracked calories. Phentermine was prescribed, with instructions to take half a dose for the first month. If there is no weight loss after a month, the dosage may be increased. Potential side effects, including agitation, restlessness, jitteriness, palpitations, and trouble sleeping, were discussed. She was advised to take the medication in the morning to avoid sleep disturbances. The importance of calorie counting and the role of diet in weight loss were emphasized. If she does not tolerate phentermine or sees limited benefits after six months, alternative treatments may be considered.  Controlled substance discussed with client. Client agrees to abide by controlled substance contract.  PDMP reviewed . shows no abnormal prescribing or filling behavior.    The treatment plan was reviewed and discussed with the patient. The pharmacy monitoring report was requested and reviewed.  Patient is appropriate for refill of this medication.  -Patient informed no medication adjustments will be made to titrate up or add additional narcotics, benzodiazepines or other controlled substances to this regimen.  Referral to pain management or behavioral health will be made as appropriate.    Orders:    phentermine (ADIPEX-P) 37.5 MG tablet; Take 0.5 Tablets by mouth every morning before breakfast for 60 days.    Controlled Substance Treatment Agreement    Generalized anxiety disorder  Chronic, stable  The patient reports that her anxiety has not worsened and remains stable. She is currently managing her anxiety without additional  medication changes.  Continue Paxil 40 gm daily        KATY (obstructive sleep apnea)  New diagnosis   Continue nightly CPAP usage   F/u with sleep medicine        Need for vaccination    Orders:    INFLUENZA VACCINE TRI INJ (PF)    Vitamin D deficiency    Orders:    ergocalciferol (DRISDOL) 25659 UNIT capsule; Take 1 Capsule by mouth every 7 days.    Prediabetes  The patient has prediabetes 5.8%   The possibility of using injectable medications for weight loss was discussed, but they are not covered for prediabetes by her insurance. The patient was advised to continue monitoring her diet and exercise.  Low carb diet recommended                Return in about 3 months (around 2/21/2025).    Please note that this dictation was created using voice recognition software. I have made every reasonable attempt to correct obvious errors, but I expect that there are errors of grammar and possibly content that I did not discover before finalizing the note.

## 2024-12-05 ENCOUNTER — APPOINTMENT (OUTPATIENT)
Dept: SLEEP MEDICINE | Facility: MEDICAL CENTER | Age: 39
End: 2024-12-05
Attending: NURSE PRACTITIONER
Payer: COMMERCIAL

## 2024-12-31 ENCOUNTER — OFFICE VISIT (OUTPATIENT)
Dept: SLEEP MEDICINE | Facility: MEDICAL CENTER | Age: 39
End: 2024-12-31
Attending: STUDENT IN AN ORGANIZED HEALTH CARE EDUCATION/TRAINING PROGRAM
Payer: COMMERCIAL

## 2024-12-31 VITALS
HEIGHT: 67 IN | WEIGHT: 256 LBS | DIASTOLIC BLOOD PRESSURE: 72 MMHG | HEART RATE: 85 BPM | BODY MASS INDEX: 40.18 KG/M2 | OXYGEN SATURATION: 97 % | SYSTOLIC BLOOD PRESSURE: 110 MMHG | RESPIRATION RATE: 16 BRPM

## 2024-12-31 DIAGNOSIS — G47.33 OSA (OBSTRUCTIVE SLEEP APNEA): ICD-10-CM

## 2024-12-31 PROCEDURE — 99212 OFFICE O/P EST SF 10 MIN: CPT | Performed by: STUDENT IN AN ORGANIZED HEALTH CARE EDUCATION/TRAINING PROGRAM

## 2024-12-31 ASSESSMENT — FIBROSIS 4 INDEX: FIB4 SCORE: 0.54

## 2024-12-31 NOTE — PROGRESS NOTES
[facility] Follow-up Visit    CC: First compliance after receiving new CPAP machine      HPI:  Allison Collins is a 39 y.o.female  with PERRI, MDD, GERD, obesity, and severe obstructive sleep apnea on CPAP.  Presents to follow-up regarding management of obstructive sleep apnea.    She reports doing well.  She has received a new CPAP machine and is using it nightly.  She finds with using CPAP she gets a better night sleep.  She has more rested throughout the day and is not sleepy.  She finds her mask and pressures comfortable.  Has no acute complaints at this time      DME provider: [facility]   Device: Airsense   Mask: hybrid fullface   Aerophagia: No   Snoring: No   Dry mouth: Yes  Leak: No   Skin irritation: No   Chin strap: No     Sleep History  8/22/2024 HST WatchPAT study showed severe obstructive sleep apnea overall AHI of 42 events an hour, time out of below 88% saturation 4.1 minutes    Patient Active Problem List    Diagnosis Date Noted    Prediabetes 11/24/2024    KATY (obstructive sleep apnea) 11/24/2024    Moderate episode of recurrent major depressive disorder (HCC) 02/15/2023    Family planning 01/11/2023    Generalized anxiety disorder 09/27/2021    Vegetarian diet 04/26/2021    Nausea 04/26/2021    Chronic pain of both knees 09/28/2020    Flat feet, bilateral 09/28/2020    Obesity, Class III, BMI 40-49.9 (morbid obesity) (HCC) 05/13/2020    Allergic rhinitis 05/13/2020    Sore armpit, left 05/13/2020       Past Medical History:   Diagnosis Date    Allergic rhinitis 5/13/2020        Past Surgical History:   Procedure Laterality Date    DENTAL EXTRACTION(S)      wisdom teeth extraction       Family History   Problem Relation Age of Onset    Hypertension Mother     Sleep Apnea Brother     Cancer Maternal Grandmother         breast cancer at 88    Hypertension Maternal Grandmother     Diabetes Maternal Grandmother        Social History     Socioeconomic History    Marital status:       Spouse name: Not on file    Number of children: Not on file    Years of education: Not on file    Highest education level: Professional school degree (e.g., MD, DDS, DVM, ALIN)   Occupational History    Not on file   Tobacco Use    Smoking status: Former     Types: Cigars, Cigarettes    Smokeless tobacco: Never   Vaping Use    Vaping status: Every Day   Substance and Sexual Activity    Alcohol use: Yes    Drug use: Not Currently    Sexual activity: Yes     Partners: Female   Other Topics Concern    Not on file   Social History Narrative    Not on file     Social Drivers of Health     Financial Resource Strain: Low Risk  (5/6/2024)    Overall Financial Resource Strain (CARDIA)     Difficulty of Paying Living Expenses: Not hard at all   Food Insecurity: No Food Insecurity (5/6/2024)    Hunger Vital Sign     Worried About Running Out of Food in the Last Year: Never true     Ran Out of Food in the Last Year: Never true   Transportation Needs: No Transportation Needs (5/6/2024)    PRAPARE - Transportation     Lack of Transportation (Medical): No     Lack of Transportation (Non-Medical): No   Physical Activity: Insufficiently Active (5/6/2024)    Exercise Vital Sign     Days of Exercise per Week: 3 days     Minutes of Exercise per Session: 30 min   Stress: Stress Concern Present (5/6/2024)    [place] citizen [place] of Occupational Health - Occupational Stress Questionnaire     Feeling of Stress : Rather much   Social Connections: Moderately Isolated (5/6/2024)    Social Connection and Isolation Panel [NHANES]     Frequency of Communication with Friends and Family: More than three times a week     Frequency of Social Gatherings with Friends and Family: Once a week     Attends Buddhist Services: Never     Active Member of Clubs or Organizations: No     Attends Club or Organization Meetings: Never     Marital Status:    Intimate Partner Violence: Not on file   Housing Stability: Low Risk  (5/6/2024)    Housing Stability  "Vital Sign     Unable to Pay for Housing in the Last Year: No     Number of Places Lived in the Last Year: 2     Unstable Housing in the Last Year: No       Current Outpatient Medications   Medication Sig Dispense Refill    phentermine (ADIPEX-P) 37.5 MG tablet Take 0.5 Tablets by mouth every morning before breakfast for 60 days. 30 Tablet 0    ergocalciferol (DRISDOL) 88433 UNIT capsule Take 1 Capsule by mouth every 7 days. 12 Capsule 0    omeprazole (PRILOSEC) 20 MG delayed-release capsule TAKE 1 CAPSULE BY MOUTH EVERY DAY 90 Capsule 1    paroxetine (PAXIL) 40 MG tablet Take 1 Tablet by mouth every day. 90 Tablet 3    methylPREDNISolone (MEDROL DOSEPAK) 4 MG Tablet Therapy Pack Take as directed on package.  Dispense one package. 1 Each 0    XOLAIR 150 MG/ML Solution Prefilled Syringe       famotidine (PEPCID AC) 10 MG tablet Take 1 Tablet by mouth 2 times a day. 60 Tablet 1    ondansetron (ZOFRAN ODT) 4 MG TABLET DISPERSIBLE Take 1 Tablet by mouth every 6 hours as needed for Nausea/Vomiting. 10 Tablet 0    montelukast (SINGULAIR) 10 MG Tab Take 10 mg by mouth.      EPINEPHrine (EPIPEN) 0.3 MG/0.3ML Solution Auto-injector solution for injection 0.3 mg by Intramuscular route Once PRN.      cetirizine (ZYRTEC) 10 MG Tab Take 10 mg by mouth every day.      diphenhydrAMINE (BENADRYL) 25 MG Tab Take 25 mg by mouth every 6 hours as needed for Sleep.       No current facility-administered medications for this visit.        ALLERGIES: Patient has no active allergies.    ROS  Constitutional: Denies fevers, Denies weight changes  Ears/Nose/Throat/Mouth: Denies nasal congestion or sore throat   Cardiovascular: Denies chest pain  Respiratory: Denies shortness of breath, Denies cough  Gastrointestinal/Hepatic: Denies nausea, vomiting  Sleep: see HPI      PHYSICAL EXAM  /72 (BP Location: Left arm, Patient Position: Sitting, BP Cuff Size: Large adult)   Pulse 85   Resp 16   Ht 1.702 m (5' 7\")   Wt 116 kg (256 lb)   " SpO2 97%   BMI 40.10 kg/m²   Appearance: Well-nourished, well-developed, no acute distress  Eyes:  No scleral icterus , EOMI  Musculoskeletal:  Grossly normal; gait and station normal; digits and nails normal  Skin:  No rashes, petechiae, cyanosis  Neurologic: without focal signs; oriented to person, time, place, and purpose; judgement intact      Medical Decision Making   Assessment and Plan  Allison Collins is a 39 y.o.female  with PERRI, MDD, GERD, obesity, and severe obstructive sleep apnea on CPAP.  Presents to follow-up regarding management of obstructive sleep apnea.    The medical record was reviewed.    Obstructive sleep apnea  Compliance data reviewed showing 90% usage > 4hours in last 30  days. Average AHI 0.9 events/hour. Pt continues to use and benefit from machine.      Current Settings auto CPAP 5-15    PLAN:   -Order placed for mask and supplies   -Advised to reach out via MyChart with questions     Has been advised to continue the current CPAP, clean equipment frequently, and get new mask and supplies as allowed by insurance and DME. Recommend an earlier appointment, if significant treatment barriers develop.    Patients with KATY are at increased risk of cardiovascular disease including coronary artery disease, systemic arterial hypertension, pulmonary arterial hypertension, cardiac arrythmias, and stroke. The patient was advised to avoid driving a motor vehicle when drowsy.    Return in about 1 year (around 12/31/2025).      Please note portions of this record was created using voice recognition software. I have made every reasonable attempt to correct obvious errors, but I expect that there are errors of grammar and possibly content I did not discover before finalizing the note.

## 2025-01-09 ENCOUNTER — OFFICE VISIT (OUTPATIENT)
Dept: OBGYN | Facility: CLINIC | Age: 40
End: 2025-01-09
Payer: COMMERCIAL

## 2025-01-09 VITALS
DIASTOLIC BLOOD PRESSURE: 72 MMHG | BODY MASS INDEX: 39.87 KG/M2 | SYSTOLIC BLOOD PRESSURE: 110 MMHG | WEIGHT: 254 LBS | HEIGHT: 67 IN

## 2025-01-09 DIAGNOSIS — N94.2 VAGINISMUS: ICD-10-CM

## 2025-01-09 DIAGNOSIS — Z01.419 WOMEN'S ANNUAL ROUTINE GYNECOLOGICAL EXAMINATION: ICD-10-CM

## 2025-01-09 PROCEDURE — 90471 IMMUNIZATION ADMIN: CPT

## 2025-01-09 PROCEDURE — 99395 PREV VISIT EST AGE 18-39: CPT | Mod: 25

## 2025-01-09 PROCEDURE — 90651 9VHPV VACCINE 2/3 DOSE IM: CPT

## 2025-01-09 ASSESSMENT — FIBROSIS 4 INDEX: FIB4 SCORE: 0.54

## 2025-01-09 NOTE — PROGRESS NOTES
ANNUAL GYNECOLOGY VISIT    Chief Complaint  Annual    Subjective  Allison Collins is a 39 y.o. female  Patient's last menstrual period was 2024 (approximate). Not using contraception who presents today for Annual Exam.      Of note, history of vaginismus. She has had a history of this since she was a young girl and has never been able to insert anything into her vagina including tampons. Denies history of sexual assault.     Preventive Care   Immunization History   Administered Date(s) Administered    Hepatitis B Vaccine (Adol/Adult) 2024    Influenza Vaccine Quad Inj (Pf) 2020    Influenza split virus trivalent (PF) 2024    PFIZER PURPLE CAP SARS-COV-2 VACCINATION (12+) 2021, 2021    Pneumococcal polysaccharide vaccine (PPSV-23) 2021    Tdap Vaccine 2020       Guardasil HPV vaccine: Would like to start HPV series today     Gynecology History and ROS  Current Sexual Activity: yes - one partner, female  History of sexually transmitted diseases? no  Abnormal discharge? no  Current Contraception:  none    Menstrual History  Patient's last menstrual period was 2024 (approximate).  Periods are regular  q 25-28 days, lasting 5-7 days.   Clots or heavy flow: yes - changing overnight pads ever 4 hours  Dysmenorrhea: yes - first couple of days   Intermenstrual bleeding/spotting: no  Significant pain with periods:no  Bothersome PMS symptoms: yes - mood fluctuations, take paroxetine   Significant Pelvic Pain: no    Pap History  Last pap smear: at least 15 years ago in [place], normal per patient  History of moderate or severe dysplasia: no    Cancer Risk Assessement:  Family history of:   - Breast cancer: Maternal Grandmother, Maternal Cousins    - Ovarian cancer: no   - Uterine cancer: no   - Colon cancer: Paternal Aunt     Obstetric History  OB History    Para Term  AB Living   0 0 0 0 0 0   SAB IAB Ectopic Molar Multiple Live Births   0 0 0 0  0 0       Past Medical History  Past Medical History:   Diagnosis Date    Allergic rhinitis 05/13/2020    Anxiety     Depression     Diabetes (HCC)     pre diabetic       Past Surgical History  Past Surgical History:   Procedure Laterality Date    DENTAL EXTRACTION(S)      wisdom teeth extraction       Social History  Social History     Tobacco Use    Smoking status: Former     Types: Cigars, Cigarettes    Smokeless tobacco: Never   Vaping Use    Vaping status: Every Day    Substances: Flavoring   Substance Use Topics    Alcohol use: Yes    Drug use: Not Currently        Family History  Family History   Problem Relation Age of Onset    Hypertension Mother     Depression Mother     Depression Father     Sleep Apnea Brother     Cancer Maternal Grandmother         breast cancer at 88    Hypertension Maternal Grandmother     Diabetes Maternal Grandmother     COPD Maternal Grandfather     Lung Cancer Maternal Grandfather        Home Medications  Current Outpatient Medications   Medication Sig    omeprazole (PRILOSEC) 20 MG delayed-release capsule TAKE 1 CAPSULE BY MOUTH EVERY DAY    paroxetine (PAXIL) 40 MG tablet Take 1 Tablet by mouth every day.    methylPREDNISolone (MEDROL DOSEPAK) 4 MG Tablet Therapy Pack Take as directed on package.  Dispense one package.    XOLAIR 150 MG/ML Solution Prefilled Syringe     ondansetron (ZOFRAN ODT) 4 MG TABLET DISPERSIBLE Take 1 Tablet by mouth every 6 hours as needed for Nausea/Vomiting.    EPINEPHrine (EPIPEN) 0.3 MG/0.3ML Solution Auto-injector solution for injection 0.3 mg by Intramuscular route Once PRN.    cetirizine (ZYRTEC) 10 MG Tab Take 10 mg by mouth every day.    diphenhydrAMINE (BENADRYL) 25 MG Tab Take 25 mg by mouth every 6 hours as needed for Sleep.    phentermine (ADIPEX-P) 37.5 MG tablet Take 0.5 Tablets by mouth every morning before breakfast for 60 days. (Patient not taking: Reported on 1/9/2025)    ergocalciferol (DRISDOL) 48920 UNIT capsule Take 1 Capsule by  "mouth every 7 days. (Patient not taking: Reported on 1/9/2025)    famotidine (PEPCID AC) 10 MG tablet Take 1 Tablet by mouth 2 times a day.    montelukast (SINGULAIR) 10 MG Tab Take 10 mg by mouth. (Patient not taking: Reported on 1/9/2025)       Allergies/Reactions  No Known Allergies    ROS  Positive ROS: See HPI   Gen: no fevers or chills, no significant weight loss or gain, excessive fatigue  Respiratory:  no cough or dyspnea  Cardiac:  no chest pain, no palpitations, no syncope  Breast: no breast discharge, pain, lump or skin changes  GI:  no heartburn, no abdominal pain, no nausea or vomiting  Urinary: no dysuria, urgency, frequency, incontinence   Psych: anxiety, taking paroxetine. Doing well   Neuro: no migraines with aura, fainting spells, numbness or tingling  Extremities: no joint pain, persistently swollen ankles, recurrent leg cramps      Physical Examination:  Vital Signs: /72 (BP Location: Left arm, Patient Position: Sitting, BP Cuff Size: Adult)   Ht 5' 7\"   Wt 254 lb   LMP 12/22/2024 (Approximate)   BMI 39.78 kg/m²       Constitutional: The patient is well developed and well nourished.  Psychiatric: Patient is oriented to time place and person.   Skin: No rash observed.  Neck: Appears symmetric. Thyroid normal size  Respiratory: normal effort  Breast: Inspection reveals no asymetry or nipple discharge, no skin thickening, dimpling or erythema.  Palpation demonstrates no masses.  Abdomen: Soft, non-tender.  Pelvic Exam:      Vulva: external female genitalia are normal in appearance. No lesions     Urethra - no lesions, no erythema     Patient could not tolerate vaginal exam nor pap smear due to severe vaginismus. Declined self-swab trial to collect pap smear.   Extremeties: Legs are symmetric and without tenderness. There is no edema present.  Assessment & Plan  Allison Collins is a 39 y.o. female who presents today for Annual Gyn Exam.     Assessment & Plan  Women's annual routine " gynecological examination  Anticipatory guidance given. Encouraged adequate water intake, healthy diet, regular exercise. Educated on Pap smear screening and guidelines for age per ACOG and ASSCP. Discussed safe sex, STI prevention, contraception/family planning. Self breast exam taught.     Patient unable to tolerate complete pelvic exam due to vaginismus. Pap smear was therefore not collected.     Orders:    9VHPV Vaccine 2-3 Dose IM    Vaginismus  Referral sent to physical therapy due to vaginismus present today. Patient willing to try pelvic floor PT.  Orders:    Referral to Physical Therapy      Return: 1-2 months for pap smear/discussion of hysterectomy with physician     SHERIN Jacob.  [facility]

## 2025-01-09 NOTE — PROGRESS NOTES
Patient here for Gyn as new patient  LMP:12/22/2024  Last Pap:2010 [facility]nl [email or web address]. New PAP Today   BCM:NONE  Mammogram:N/A  Phone number/Pharmacy verified  Pt states:No STD Testing . Would like a breast exam .  HPV Vaccine received today   R Deltoid  Pt tolerated well  Pt is aware of when to get 2nd dose and 3rd dose   2nd dose would be March  3rd dose would be July

## 2025-02-08 DIAGNOSIS — K21.9 GASTROESOPHAGEAL REFLUX DISEASE, UNSPECIFIED WHETHER ESOPHAGITIS PRESENT: ICD-10-CM

## 2025-02-10 RX ORDER — OMEPRAZOLE 20 MG/1
20 CAPSULE, DELAYED RELEASE ORAL DAILY
Qty: 90 CAPSULE | Refills: 1 | Status: SHIPPED | OUTPATIENT
Start: 2025-02-10

## 2025-03-14 ENCOUNTER — OFFICE VISIT (OUTPATIENT)
Dept: OBGYN | Facility: CLINIC | Age: 40
End: 2025-03-14
Payer: COMMERCIAL

## 2025-03-14 ENCOUNTER — RESEARCH ENCOUNTER (OUTPATIENT)
Dept: RESEARCH | Facility: MEDICAL CENTER | Age: 40
End: 2025-03-14

## 2025-03-14 VITALS
HEIGHT: 67 IN | DIASTOLIC BLOOD PRESSURE: 80 MMHG | SYSTOLIC BLOOD PRESSURE: 125 MMHG | WEIGHT: 250.4 LBS | BODY MASS INDEX: 39.3 KG/M2

## 2025-03-14 DIAGNOSIS — Z80.3 FAMILY HISTORY OF BREAST CANCER: ICD-10-CM

## 2025-03-14 DIAGNOSIS — N92.0 MENORRHAGIA WITH REGULAR CYCLE: ICD-10-CM

## 2025-03-14 DIAGNOSIS — R73.03 PREDIABETES: ICD-10-CM

## 2025-03-14 DIAGNOSIS — Z01.818 ENCOUNTER FOR PRE-OPERATIVE EXAMINATION: Primary | ICD-10-CM

## 2025-03-14 DIAGNOSIS — G47.33 OSA (OBSTRUCTIVE SLEEP APNEA): ICD-10-CM

## 2025-03-14 DIAGNOSIS — Z23 NEED FOR HPV VACCINATION: ICD-10-CM

## 2025-03-14 DIAGNOSIS — N94.2 VAGINISMUS: ICD-10-CM

## 2025-03-14 DIAGNOSIS — R10.2 PELVIC PAIN: ICD-10-CM

## 2025-03-14 PROCEDURE — 90471 IMMUNIZATION ADMIN: CPT | Performed by: OBSTETRICS & GYNECOLOGY

## 2025-03-14 PROCEDURE — 3074F SYST BP LT 130 MM HG: CPT | Performed by: OBSTETRICS & GYNECOLOGY

## 2025-03-14 PROCEDURE — 90651 9VHPV VACCINE 2/3 DOSE IM: CPT | Mod: JZ | Performed by: OBSTETRICS & GYNECOLOGY

## 2025-03-14 PROCEDURE — 99215 OFFICE O/P EST HI 40 MIN: CPT | Mod: 25 | Performed by: OBSTETRICS & GYNECOLOGY

## 2025-03-14 PROCEDURE — 3079F DIAST BP 80-89 MM HG: CPT | Performed by: OBSTETRICS & GYNECOLOGY

## 2025-03-14 ASSESSMENT — FIBROSIS 4 INDEX: FIB4 SCORE: 0.56

## 2025-03-14 NOTE — PROGRESS NOTES
"[facility]  GYNECOLOGY VISIT    CC:  Gynecologic Exam (Hysterectomy Consult, Vaginismus )       HPI: Patient is a 40 y.o.  who presents for follow up for management of vaginismus, cyclic mood changes, heavy menses, and dysmenorrhea.    She has been considering a hysterectomy for a very long time (20+ years). She reports this is for several reasons including heavy bleeding, dysmenorrhea, concern about cancer, and most of all mood changes with her menses. These mood changes are the primary reason. They worsen significantly just before her menses and during her menses. Now on medications for this which has helped, but this is still causing her significant distress. There may also be a component of meeting her gender identity in all of this.    Menses are regular, lasting 5-7 days, first few days are heavy. Moderate cramps during menses that do not cause significant issues with her life. Family history of BC (M GM (70-80's), M cousin (50?), and colon cancer (P. Aunt and P uncle). Never had genetic testing for genetic causes of Ca.    She also has a lifelong history of vaginismus which means she cannot insert anything into her vagina. She has never been able to have a pap or exam. This is not bothersome to her very much in life, but does worry about that she has never had a pap or exam.       ROS:   General: denies fever / chills  HEENT: denies sore throat:  CV: denies chest pain:  Repiratory: denies shortness of breath  GI: denies abdominal pain  : denies dysuria:    PFSH:  I personally reviewed the past medical and surgical histories. Any changes have been updated in the chart.      PHYSICAL EXAMINATION:  Vital Signs:   Vitals:    25 0853   BP: 125/80   BP Location: Right arm   Patient Position: Sitting   BP Cuff Size: Large adult long   Weight: 250 lb 6.4 oz   Height: 5' 7\"     Body mass index is 39.22 kg/m².    Gen: appears well, NAD  Respiratory: normal effort  Abdomen: Soft, " non-tender.  Pelvic Exam:    Vulva: normal.    Urethra: normal.   Vagina: normal.    Cervix: normal.    Uterus: normal size, shape and contour.    left adnexa: normal.   right adnexa: normal.   Perineum: normal.    ASSESSMENT AND PLAN:  40 y.o.      # Cyclic mood changes  # Dysmenorrhea  # Heavy menses  # Pre-operative consultation  -  Spent a long time counseling on management options and understanding the patients symptoms and desire for hysterectomy.   - Reviewed management of these symptoms with surgery (TLH, BS vs TLH BSO) and the risks and benefits of each.   - Reviewed management with hormonal contraception as well and the risks and benefits of this.  - Desires to consider all options and return to discuss next steps.  - TAUS ordered for pre-op eval of uterus given cannot perform exam  - Labs: A1C, FSH, E2 (hormones per patient request)  - Requests anesthesia consult to discuss given KATY and different surgical options    # Vaginismus  - Reviewed can do EUA, pap under anesthesia.  - Would recommend this especially if does not do hysterectomy  -Requests anesthesia consult to discuss given KATY and different surgical options  - Declines other management at this time    # Need for HPV vaccine  - Dose # 2 due today    # Family history of BC and colon ca  - [facility] referral  - Would prefer the results are back before performing hyst    RTC in 2 months    My total time spent caring for the patient on the day of the encounter was 48 minutes.   This does not include time spent on separately billable procedures/tests.      Luz Maria Montanez MD  Obstetrics and Gynecology

## 2025-03-14 NOTE — PROGRESS NOTES
Patient here for GYN visit - Hysterectomy Consult   Last seen on : 1/9/25 w/ Karin   LMP : 2/26/25  BCM : None  Pap : 2010, attempted to get updated one  HPV: 1st 1/9/25, 2nd offered today -   Phone/Pharmacy verified: [phone]    @ last visit, pt couldn't due exam due to vaginismus (too painful)   Pt states she is just very concerned that she cannot get through a pelvic exam.

## 2025-03-14 NOTE — RESEARCH NOTE
Message left for patient to return call to clinic   Patient has been referred by Luz Maria Montanez M.D.. The initial referral follow-up message, which includes the consent form link, has been sent to the patient.    Eligible Studies: HNP

## 2025-04-17 ENCOUNTER — HOSPITAL ENCOUNTER (OUTPATIENT)
Dept: LAB | Facility: MEDICAL CENTER | Age: 40
End: 2025-04-17
Attending: OBSTETRICS & GYNECOLOGY
Payer: COMMERCIAL

## 2025-04-17 DIAGNOSIS — N92.0 MENORRHAGIA WITH REGULAR CYCLE: ICD-10-CM

## 2025-04-17 DIAGNOSIS — R73.03 PREDIABETES: ICD-10-CM

## 2025-04-17 DIAGNOSIS — R10.2 PELVIC PAIN: ICD-10-CM

## 2025-04-17 DIAGNOSIS — Z00.6 CLINICAL TRIAL PARTICIPANT: ICD-10-CM

## 2025-04-17 LAB
EST. AVERAGE GLUCOSE BLD GHB EST-MCNC: 131 MG/DL
ESTRADIOL SERPL-MCNC: 55.4 PG/ML
FSH SERPL-ACNC: 8.5 MIU/ML
HBA1C MFR BLD: 6.2 % (ref 4–5.6)

## 2025-04-17 PROCEDURE — 82670 ASSAY OF TOTAL ESTRADIOL: CPT

## 2025-04-17 PROCEDURE — 83001 ASSAY OF GONADOTROPIN (FSH): CPT

## 2025-04-17 PROCEDURE — 83036 HEMOGLOBIN GLYCOSYLATED A1C: CPT

## 2025-04-17 PROCEDURE — 36415 COLL VENOUS BLD VENIPUNCTURE: CPT

## 2025-04-18 ENCOUNTER — RESULTS FOLLOW-UP (OUTPATIENT)
Dept: OBGYN | Facility: CLINIC | Age: 40
End: 2025-04-18
Payer: COMMERCIAL

## 2025-05-01 ENCOUNTER — APPOINTMENT (OUTPATIENT)
Dept: SPORTS MEDICINE | Facility: OTHER | Age: 40
End: 2025-05-01
Payer: COMMERCIAL

## 2025-05-01 ENCOUNTER — ANCILLARY PROCEDURE (OUTPATIENT)
Dept: SPORTS MEDICINE | Facility: OTHER | Age: 40
End: 2025-05-01
Attending: FAMILY MEDICINE
Payer: COMMERCIAL

## 2025-05-01 VITALS
SYSTOLIC BLOOD PRESSURE: 118 MMHG | OXYGEN SATURATION: 100 % | HEIGHT: 67 IN | WEIGHT: 250.4 LBS | BODY MASS INDEX: 39.3 KG/M2 | TEMPERATURE: 98.1 F | DIASTOLIC BLOOD PRESSURE: 74 MMHG | RESPIRATION RATE: 16 BRPM | HEART RATE: 76 BPM

## 2025-05-01 DIAGNOSIS — M76.52 PATELLAR TENDINITIS, LEFT KNEE: ICD-10-CM

## 2025-05-01 PROCEDURE — 99213 OFFICE O/P EST LOW 20 MIN: CPT | Performed by: FAMILY MEDICINE

## 2025-05-01 PROCEDURE — 3078F DIAST BP <80 MM HG: CPT | Performed by: FAMILY MEDICINE

## 2025-05-01 PROCEDURE — 76882 US LMTD JT/FCL EVL NVASC XTR: CPT | Performed by: FAMILY MEDICINE

## 2025-05-01 PROCEDURE — 3074F SYST BP LT 130 MM HG: CPT | Performed by: FAMILY MEDICINE

## 2025-05-01 ASSESSMENT — FIBROSIS 4 INDEX: FIB4 SCORE: 0.56

## 2025-05-01 NOTE — PROGRESS NOTES
"Chief Complaint   Patient presents with    Knee Pain     L knee pain      CHIEF COMPLAINT:    Allison Collins is complaining of NEW PROBLEM LEFT knee pain  Date of injury, roughly January 2025  Getting out of bed, slipped and landed on a flexed LEFT knee  She experienced immediate dull pain, with subsequent swelling for 3 to 4 days  Bruising lasted for about a week and symptoms improved after about a week, but she continues to have pain particularly with kneeling on the LEFT knee  And initially she was having some end of day swelling in the LEFT knee for about a month after the initial injury  Pain is predominately at the anterior aspect the LEFT knee at the inferior pole of the patellar  No radiation  Worse with kneeling  Improved with rest  Icing also helps  Tylenol/ibuprofen also helps some  No night symptoms  No prior significant injuries or issues with the LEFT knee      Raising 2 children  Weight lifting, running RETIRED from tackle football with [employer]     PHYSICAL EXAM:  /74 (BP Location: Left arm, Patient Position: Sitting, BP Cuff Size: Adult)   Pulse 76   Temp 36.7 °C (98.1 °F) (Temporal)   Resp 16   Ht 1.702 m (5' 7\")   Wt 114 kg (250 lb 6.4 oz)   SpO2 100%   BMI 39.22 kg/m²      obese in no apparent distress, alert and oriented x 3.  Gait: normal     No acute distress  Normal respiratory effort  Mildly antalgic gait    Knee exam:  RIGHT KNEE:  Normal alignment  No visual swelling or deformity  Anterior knee nontender  Negative apprehension  No  joint line tenderness medially or laterally  Leigha's testing is negative medially and laterally  Lachman's testing is trace with good endpoint  No laxity to varus and valgus stress  The legs otherwise neurovascularly intact    LEFT KNEE:  Normal alignment  No visual swelling or deformity  Anterior knee tender at the inferior pole of the patella  Negative apprehension  No  joint line tenderness medially or " laterally  Leigha's testing is negative medially and laterally  Lachman's testing is trace with good endpoint  No laxity to varus and valgus stress  The legs otherwise neurovascularly intact    Additional Findings: None    1. Patellar tendinitis, left knee (at the inferior pole of the patella)  US-POCT US MUSCULOSKELETAL LIMITED JOINTT        NEW PROBLEM LEFT knee pain  Date of injury, roughly January 2025  Getting out of bed, slipped and landed on a flexed LEFT knee    Provided with the home exercise program  Inferior pole tendonitis  Recommended aggressive self massage, provided with knee exercise program and recommend eccentric strengthening    Return in about 6 weeks (around 6/12/2025).  To see how she is doing with home exercise program at that point            Reading Physician Reading Date Result Priority   Artis Slaughter M.D.  [phone]     5/1/2025 Routine     Narrative  Limited musculoskeletal ultrasound evaluation of the LEFT inferior pole  the patella/patellar tendon.  Short and long axis views obtained  There is a hypoechoic region of the proximal patellar tendon mostly in the  central portion of the tendon noted in both views  Impression   LEFT knee proximal patellar tendinopathy        Exam Ended: 05/01/25  9:18 AM Last Resulted: 05/01/25 10:27 AM               1/21/2022 9:01 AM     HISTORY/REASON FOR EXAM:  Atraumatic Pain/Swelling/Deformity.        TECHNIQUE/EXAM DESCRIPTION AND NUMBER OF VIEWS:  4 views of the  right knee.     COMPARISON: 10/12/2020     FINDINGS:     MINERALIZATION: Mineralization is unremarkable for age.     INJURY: No acute fracture or gross malalignment is seen.     MEDIAL JOINT COMPARTMENT: There is mild loss of joint space.  LATERAL JOINT COMPARTMENT: Unremarkable  PATELLOFEMORAL JOINT COMPARTMENT: Unremarkable  No joint effusion evident.        IMPRESSION:     No radiographic evidence of acute traumatic injury.             Exam Ended: 01/21/22  9:16 AM Last Resulted:  01/21/22  9:20 AM           Self-referred

## 2025-05-01 NOTE — PROCEDURES
Reading Physician Reading Date Result Priority   Artis Slaughter M.D.  [phone]     5/1/2025 Routine     Narrative  Limited musculoskeletal ultrasound evaluation of the LEFT inferior pole  the patella/patellar tendon.  Short and long axis views obtained  There is a hypoechoic region of the proximal patellar tendon mostly in the  central portion of the tendon noted in both views  Impression   LEFT knee proximal patellar tendinopathy

## 2025-05-08 ENCOUNTER — APPOINTMENT (OUTPATIENT)
Dept: RADIOLOGY | Facility: MEDICAL CENTER | Age: 40
End: 2025-05-08
Attending: OBSTETRICS & GYNECOLOGY
Payer: COMMERCIAL

## 2025-05-08 DIAGNOSIS — N92.0 MENORRHAGIA WITH REGULAR CYCLE: ICD-10-CM

## 2025-05-08 DIAGNOSIS — R10.2 PELVIC PAIN: ICD-10-CM

## 2025-05-08 PROCEDURE — 76856 US EXAM PELVIC COMPLETE: CPT

## 2025-05-09 ENCOUNTER — RESULTS FOLLOW-UP (OUTPATIENT)
Dept: OBGYN | Facility: CLINIC | Age: 40
End: 2025-05-09
Payer: COMMERCIAL

## 2025-05-11 DIAGNOSIS — F41.1 GENERALIZED ANXIETY DISORDER: ICD-10-CM

## 2025-05-11 DIAGNOSIS — F32.2 CURRENT SEVERE EPISODE OF MAJOR DEPRESSIVE DISORDER WITHOUT PSYCHOTIC FEATURES WITHOUT PRIOR EPISODE (HCC): ICD-10-CM

## 2025-05-11 DIAGNOSIS — F33.1 MODERATE EPISODE OF RECURRENT MAJOR DEPRESSIVE DISORDER (HCC): ICD-10-CM

## 2025-05-12 RX ORDER — PAROXETINE 40 MG/1
40 TABLET, FILM COATED ORAL DAILY
Qty: 90 TABLET | Refills: 0 | Status: SHIPPED | OUTPATIENT
Start: 2025-05-12

## 2025-05-14 ENCOUNTER — APPOINTMENT (OUTPATIENT)
Dept: OBGYN | Facility: CLINIC | Age: 40
End: 2025-05-14
Payer: COMMERCIAL

## 2025-05-23 ENCOUNTER — APPOINTMENT (OUTPATIENT)
Dept: MEDICAL GROUP | Facility: MEDICAL CENTER | Age: 40
End: 2025-05-23
Payer: COMMERCIAL

## 2025-05-23 VITALS
OXYGEN SATURATION: 98 % | TEMPERATURE: 97.1 F | HEIGHT: 67 IN | DIASTOLIC BLOOD PRESSURE: 72 MMHG | SYSTOLIC BLOOD PRESSURE: 118 MMHG | BODY MASS INDEX: 38 KG/M2 | HEART RATE: 66 BPM | WEIGHT: 242.1 LBS

## 2025-05-23 DIAGNOSIS — E55.9 VITAMIN D DEFICIENCY: ICD-10-CM

## 2025-05-23 DIAGNOSIS — R73.03 PREDIABETES: Primary | ICD-10-CM

## 2025-05-23 DIAGNOSIS — E66.9 OBESITY (BMI 30-39.9): ICD-10-CM

## 2025-05-23 DIAGNOSIS — Z00.00 WELLNESS EXAMINATION: ICD-10-CM

## 2025-05-23 PROCEDURE — 3078F DIAST BP <80 MM HG: CPT | Performed by: STUDENT IN AN ORGANIZED HEALTH CARE EDUCATION/TRAINING PROGRAM

## 2025-05-23 PROCEDURE — 99214 OFFICE O/P EST MOD 30 MIN: CPT | Performed by: STUDENT IN AN ORGANIZED HEALTH CARE EDUCATION/TRAINING PROGRAM

## 2025-05-23 PROCEDURE — 3074F SYST BP LT 130 MM HG: CPT | Performed by: STUDENT IN AN ORGANIZED HEALTH CARE EDUCATION/TRAINING PROGRAM

## 2025-05-23 ASSESSMENT — PATIENT HEALTH QUESTIONNAIRE - PHQ9: CLINICAL INTERPRETATION OF PHQ2 SCORE: 0

## 2025-05-23 ASSESSMENT — FIBROSIS 4 INDEX: FIB4 SCORE: 0.56

## 2025-05-23 NOTE — PROGRESS NOTES
"Subjective:     CC:   Chief Complaint   Patient presents with    Lab Results       HPI:   Allison presents today with    Verbal consent was acquired by the patient to use [employer] ambient listening note generation during this visit Yes   History of Present Illness  The patient presents for evaluation of prediabetes, anxiety, allergies, and sleep apnea.    She has been proactive in managing her prediabetes, implementing significant dietary modifications since her last visit. These changes include a reduction in carbohydrate and fat intake, elimination of candy and other high-sugar foods, and an increase in protein consumption at each meal and snack. She has also increased her water intake. She plans to resume physical activity next week under the guidance of a . She is currently on Paxil 40 mg and Pepcid. She has completed her course of vitamin D and is requesting a re-evaluation of her vitamin D levels.    She is doing well with her allergies. She is not on montelukast. She is only on Zyrtec and Benadryl as needed right now and continues with her allergy injections.    She has sleep apnea.    Results  Labs   - A1c: 6.2   - Thyroid level: Normal       Health Maintenance: Completed    ROS:  ROS    Review of systems unremarkable except for concerns noted by patient or items listed.    Please see HPI for additional ROS.      Objective:     Exam:  /72 (BP Location: Left arm, Patient Position: Sitting, BP Cuff Size: Adult long)   Pulse 66   Temp 36.2 °C (97.1 °F) (Temporal)   Ht 1.702 m (5' 7\")   Wt 110 kg (242 lb 1.6 oz)   LMP 05/19/2025   SpO2 98%   BMI 37.92 kg/m²  Body mass index is 37.92 kg/m².    Physical Exam  Constitutional:       Appearance: Normal appearance.   HENT:      Head: Normocephalic.   Eyes:      General: No scleral icterus.  Cardiovascular:      Rate and Rhythm: Normal rate and regular rhythm.      Pulses: Normal pulses.      Heart sounds: Normal heart sounds. "   Pulmonary:      Effort: Pulmonary effort is normal.      Breath sounds: Normal breath sounds.   Musculoskeletal:      Right lower leg: No edema.      Left lower leg: No edema.   Skin:     General: Skin is warm.   Neurological:      Mental Status: She is alert and oriented to person, place, and time.   Psychiatric:         Mood and Affect: Mood normal.         Behavior: Behavior normal.             Labs: Reviewed    Assessment & Plan:     40 y.o. female with the following -     1. Prediabetes (Primary)  Chronic, worsening  - A1c level increased from 5.8 to 6.2 over the past year  - Approximately 15 pounds lost since November, but A1c level remains elevated  - Advised to continue current regimen of diet and exercise, with expectation of decrease in A1c levels to around 5.8 or 5.9  - Discussed potential benefits of metformin, including reducing insulin resistance and aiding in weight loss  - Prefers to manage condition through lifestyle modifications at this time  - Follow-up A1c test to be conducted in 6 months  - Pharmacological intervention to be considered if A1c level exceeds 6.4  - HEMOGLOBIN A1C; Future    2. Obesity (BMI 30-39.9)  Chronic, stable  - continue exercise , low calorie diet for weight loss  - Use of GLP-1 agonists discussed, but not covered by insurance for prediabetes  - Informed about availability of compounded versions of these medications through online platforms such as [employer], Weight [employer], or [employer]  - Potential side effects discussed, including gastrointestinal symptoms and rare but serious risk of pancreatitis  - Advised to maintain adequate hydration and to discontinue medication and seek immediate medical attention if experiencing severe abdominal pain, nausea, or vomiting  - Advised to avoid these medications if family history of medullary thyroid cancer or MEN syndrome      3. Vitamin D deficiency  - VITAMIN D,25 HYDROXY (DEFICIENCY); Future    4. Healthcare   - Completed course of  vitamin D, requesting re-evaluation of vitamin D levels  - Has not yet undergone Pap smear due to need for sedation, as Xanax was ineffective  - OB/GYN proposed performing procedure under anesthesia, coinciding with planned hysterectomy  - Recent uterine ultrasound revealed no abnormalities  - Re-evaluation of vitamin D levels to be conducted in 6 months  - Comp Metabolic Panel; Future  - Lipid Profile; Future  - TSH WITH REFLEX TO FT4; Future    5. Anxiety:  - Currently on Paxil 40 mg  - Thyroid function to be monitored due to history of anxiety    6. Allergies:  - Managing allergies with Zyrtec and Benadryl as needed, along with allergy injections    7. Sleep apnea:  Chronic,stable  Continue cpap use   - Use of GLP-1 agonists discussed as potential treatment option for weight loss, which could also benefit sleep apnea  - Medications not covered by insurance for prediabetes      Please note that this dictation was created using voice recognition software. I have made every reasonable attempt to correct obvious errors, but I expect that there are errors of grammar and possibly content that I did not discover before finalizing the note.

## 2025-06-05 ENCOUNTER — APPOINTMENT (OUTPATIENT)
Dept: LAB | Facility: MEDICAL CENTER | Age: 40
End: 2025-06-05
Attending: FAMILY MEDICINE

## 2025-06-20 ENCOUNTER — HOSPITAL ENCOUNTER (OUTPATIENT)
Facility: MEDICAL CENTER | Age: 40
End: 2025-06-20
Attending: FAMILY MEDICINE
Payer: COMMERCIAL

## 2025-06-20 DIAGNOSIS — Z00.6 RESEARCH STUDY PATIENT: ICD-10-CM

## 2025-07-04 LAB
APOB+LDLR+PCSK9 GENE MUT ANL BLD/T: NOT DETECTED
BRCA1+BRCA2 DEL+DUP + FULL MUT ANL BLD/T: NOT DETECTED
MLH1+MSH2+MSH6+PMS2 GN DEL+DUP+FUL M: NOT DETECTED

## 2025-07-07 ENCOUNTER — RESULTS FOLLOW-UP (OUTPATIENT)
Dept: MEDICAL GROUP | Facility: PHYSICIAN GROUP | Age: 40
End: 2025-07-07
Payer: COMMERCIAL

## 2025-07-18 DIAGNOSIS — E55.9 VITAMIN D DEFICIENCY: ICD-10-CM

## 2025-07-21 RX ORDER — ERGOCALCIFEROL 1.25 MG/1
50000 CAPSULE ORAL
Qty: 12 CAPSULE | Refills: 0 | Status: SHIPPED | OUTPATIENT
Start: 2025-07-21

## 2025-07-24 ENCOUNTER — PATIENT MESSAGE (OUTPATIENT)
Dept: MEDICAL GROUP | Facility: MEDICAL CENTER | Age: 40
End: 2025-07-24
Payer: COMMERCIAL